# Patient Record
Sex: FEMALE | Race: WHITE | NOT HISPANIC OR LATINO | Employment: OTHER | URBAN - NONMETROPOLITAN AREA
[De-identification: names, ages, dates, MRNs, and addresses within clinical notes are randomized per-mention and may not be internally consistent; named-entity substitution may affect disease eponyms.]

---

## 2017-03-27 ENCOUNTER — OFFICE VISIT (OUTPATIENT)
Dept: CARDIOLOGY | Facility: CLINIC | Age: 67
End: 2017-03-27

## 2017-03-27 VITALS
BODY MASS INDEX: 27.11 KG/M2 | HEIGHT: 63 IN | SYSTOLIC BLOOD PRESSURE: 120 MMHG | DIASTOLIC BLOOD PRESSURE: 84 MMHG | HEART RATE: 80 BPM | WEIGHT: 153 LBS

## 2017-03-27 DIAGNOSIS — I25.2 OLD MI (MYOCARDIAL INFARCTION): ICD-10-CM

## 2017-03-27 DIAGNOSIS — E78.00 PURE HYPERCHOLESTEROLEMIA: ICD-10-CM

## 2017-03-27 DIAGNOSIS — E11.9 CONTROLLED TYPE 2 DIABETES MELLITUS WITHOUT COMPLICATION, WITHOUT LONG-TERM CURRENT USE OF INSULIN (HCC): ICD-10-CM

## 2017-03-27 DIAGNOSIS — I51.9 LEFT VENTRICULAR DYSFUNCTION: Primary | ICD-10-CM

## 2017-03-27 DIAGNOSIS — I10 ESSENTIAL HYPERTENSION: ICD-10-CM

## 2017-03-27 DIAGNOSIS — Z79.899 MEDICATION MANAGEMENT: ICD-10-CM

## 2017-03-27 DIAGNOSIS — F17.210 CIGARETTE SMOKER: ICD-10-CM

## 2017-03-27 DIAGNOSIS — Z95.5 HISTORY OF PLACEMENT OF STENT IN LAD CORONARY ARTERY: ICD-10-CM

## 2017-03-27 PROCEDURE — 99406 BEHAV CHNG SMOKING 3-10 MIN: CPT | Performed by: NURSE PRACTITIONER

## 2017-03-27 PROCEDURE — 99213 OFFICE O/P EST LOW 20 MIN: CPT | Performed by: NURSE PRACTITIONER

## 2017-03-27 RX ORDER — LACTOBACILLUS RHAMNOSUS GG 10B CELL
1 CAPSULE ORAL DAILY
COMMUNITY
End: 2018-11-06 | Stop reason: ALTCHOICE

## 2017-03-27 RX ORDER — FLUTICASONE PROPIONATE 50 MCG
2 SPRAY, SUSPENSION (ML) NASAL DAILY PRN
COMMUNITY
End: 2018-11-06 | Stop reason: ALTCHOICE

## 2017-03-27 NOTE — PROGRESS NOTES
Chief Complaint   Patient presents with   • Follow-up     6 month follow-up, denies cardiac symptoms,recent labs per PCP.   • Med Refill     needs refills on losartan, plavix, nitrostat, patient brought medications with visit.       Estelle Ford is a 66 y.o. female  with a history of ischemic heart disease diagnosed in 1999 when she had stenting of the LAD. She presented in June of 2014 with acute MI found to have 100% occluded LAD and underwent stenting of the same. Post interventional course was complicated with respiratory distress that slowly improved. At the time of discharge, EF improved to 40%. Last echo in March showed EF stable at 45-50%, WMA in the septum similar to prior. Today she comes to the office for a follow up appointment and no complaints are voiced. She remains active and denies significant fatigue, chest pain or palpitations. She continues to have shortness of breath but continues to smoke.     HPI         Cardiac History:    Past Surgical History:   Procedure Laterality Date   • CARDIOVASCULAR STRESS TEST  03/20/2008    6 min, 97% THR. Negative.   • CARDIOVASCULAR STRESS TEST  08/01/2012    3 min, 87%THR. R/O Septal Ischemia   • CATH LAB PROCEDURE  1999    stenting   • CATH LAB PROCEDURE  05/13/2005    60% LAD and D1   • CATH LAB PROCEDURE  06/24/2014    Dr Covington- 100% LAD  3.2x12 Promus   • ECHO - CONVERTED  03/20/2008    EF >60%   • ECHO - CONVERTED  08/01/2012    EF 60% Septal WMA   • ECHO - CONVERTED  06/24/2014    ([facility]) EF 40%. Septal WMA. RVSP- 40mmHg   • ECHO - CONVERTED  03/15/2016    EF 45-50%, apical WMA, similar to prior       Current Outpatient Prescriptions   Medication Sig Dispense Refill   • aspirin 81 MG EC tablet Take 81 mg by mouth daily.     • carvedilol (COREG) 6.25 MG tablet 1/2 tab twice a day     • cholecalciferol (VITAMIN D3) 1000 UNITS tablet Take 1,000 Units by mouth daily.     • clopidogrel (PLAVIX) 75 MG tablet Take 1 tablet by mouth Daily. 30  "tablet 7   • fluticasone (FLONASE) 50 MCG/ACT nasal spray 2 sprays into each nostril Daily.     • losartan (COZAAR) 50 MG tablet Take 1 tablet by mouth Daily. 30 tablet 7   • metFORMIN (GLUCOPHAGE) 500 MG tablet Take 500 mg by mouth Daily With Breakfast.     • Naphazoline-Pheniramine (EQ EYE ALLERGY RELIEF OP) Apply 1 drop to eye Daily. 1 drop each eye daily as needed.     • probiotic (CULTURELLE) capsule capsule Take 1 capsule by mouth Daily.     • ZETIA 10 MG tablet TAKE ONE TABLET BY MOUTH DAILY 30 tablet 7   • nitroglycerin (NITROSTAT) 0.4 MG SL tablet 1 under the tongue as needed for angina, may repeat q5mins for up three doses 25 tablet 1     No current facility-administered medications for this visit.        Amoxicillin; Dye fdc red [red dye]; Penicillins; Sulfa antibiotics; and Tetracyclines & related    Past Medical History:   Diagnosis Date   • COPD (chronic obstructive pulmonary disease)    • Diabetes mellitus    • History of hysterectomy    • Hx of appendectomy    • Hx of cholecystectomy    • Hyperlipidemia    • Hypertension    • IHD (ischemic heart disease)        Social History     Social History   • Marital status:      Spouse name: N/A   • Number of children: N/A   • Years of education: N/A     Occupational History   • Not on file.     Social History Main Topics   • Smoking status: Current Every Day Smoker     Packs/day: 0.50     Types: Cigarettes   • Smokeless tobacco: Never Used   • Alcohol use No   • Drug use: No   • Sexual activity: Not on file     Other Topics Concern   • Not on file     Social History Narrative       Family History   Problem Relation Age of Onset   • Asthma Mother    • Stroke Father    • Hypertension Father    • Hyperlipidemia Father    • Heart attack Other        Review of Systems   Constitutional: Negative.    HENT: Negative.    Respiratory: Positive for cough (\"smokers cough\") and shortness of breath.    Cardiovascular: Negative.    Gastrointestinal: Negative.  " "  Genitourinary: Negative.    Musculoskeletal: Positive for arthralgias. Negative for gait problem.   Neurological: Negative.    Psychiatric/Behavioral: Negative for sleep disturbance. The patient is nervous/anxious (reports a lot of stress related to  illness and daughter diagnosed with cancer. ).        Diabetes- No  Thyroid-normal    Objective     /84 (BP Location: Left arm)  Pulse 80  Ht 63\" (160 cm)  Wt 153 lb (69.4 kg)  BMI 27.1 kg/m2    Physical Exam   Constitutional: She is oriented to person, place, and time. Vital signs are normal. She appears well-developed.   Eyes: Pupils are equal, round, and reactive to light.   Neck: Neck supple. No JVD present. Carotid bruit is not present.   Cardiovascular: Normal rate, regular rhythm and S1 normal.    Pulses:       Radial pulses are 2+ on the right side, and 2+ on the left side.   Slightly loud S2   Pulmonary/Chest: Effort normal. No respiratory distress. She has wheezes.   Abdominal: Soft. Bowel sounds are normal.   Musculoskeletal: She exhibits no edema.   Neurological: She is alert and oriented to person, place, and time.   Skin: Skin is warm and dry.   Psychiatric: She has a normal mood and affect. Her behavior is normal. Judgment and thought content normal.   Vitals reviewed.    Procedures        Assessment/Plan      Nelida was seen today for follow-up and med refill.    Diagnoses and all orders for this visit:    Left ventricular dysfunction    Essential hypertension    Pure hypercholesterolemia    Medication management    Controlled type 2 diabetes mellitus without complication, without long-term current use of insulin    Old MI (myocardial infarction)    History of placement of stent in LAD coronary artery    Cigarette smoker    Other orders  -     clopidogrel (PLAVIX) 75 MG tablet; Take 1 tablet by mouth Daily.  -     losartan (COZAAR) 50 MG tablet; Take 1 tablet by mouth Daily.  -     nitroglycerin (NITROSTAT) 0.4 MG SL tablet; 1 under " the tongue as needed for angina, may repeat q5mins for up three doses      We discussed repeating echocardiogram but given she had some improvement noted on 2016 echo, no new or worsening symptoms, and improvement in fatigue we will defer until next visit and then reconsider echo and stress test. No medication changes made today. We discussed smoking cessation for 3 minutes. She understands the risk to health if she continues to smoke. In the past she has failed nicoderm patches and is unable to use gum due to problems with her teeth. She agrees to try weaning off cigarettes and to try to develop healthier habit in place of smoking.            Electronically signed by ROSALINDA Pires,  March 28, 2017 8:47 AM

## 2017-03-28 RX ORDER — CLOPIDOGREL BISULFATE 75 MG/1
75 TABLET ORAL DAILY
Qty: 30 TABLET | Refills: 7 | Status: SHIPPED | OUTPATIENT
Start: 2017-03-28 | End: 2017-11-07 | Stop reason: SDUPTHER

## 2017-03-28 RX ORDER — LOSARTAN POTASSIUM 50 MG/1
50 TABLET ORAL DAILY
Qty: 30 TABLET | Refills: 7 | Status: SHIPPED | OUTPATIENT
Start: 2017-03-28 | End: 2017-11-07 | Stop reason: SDUPTHER

## 2017-03-28 RX ORDER — NITROGLYCERIN 0.4 MG/1
TABLET SUBLINGUAL
Qty: 25 TABLET | Refills: 1 | Status: SHIPPED | OUTPATIENT
Start: 2017-03-28 | End: 2018-05-09 | Stop reason: SDUPTHER

## 2017-07-20 RX ORDER — CARVEDILOL 6.25 MG/1
TABLET ORAL
Qty: 60 TABLET | Refills: 10 | Status: SHIPPED | OUTPATIENT
Start: 2017-07-20 | End: 2017-11-07 | Stop reason: DRUGHIGH

## 2017-07-20 NOTE — TELEPHONE ENCOUNTER
Spoke with pt, she actually takes 1/2 tab BID but would like us to go ahead and send in like it has been for 1 tab daily.

## 2017-08-28 ENCOUNTER — TELEPHONE (OUTPATIENT)
Dept: CARDIOLOGY | Facility: CLINIC | Age: 67
End: 2017-08-28

## 2017-08-28 NOTE — TELEPHONE ENCOUNTER
[facility] faxed over a cardiac clearance request for colonoscopy on 09/19. Patient is taking aspirin 81 mg and plavix.

## 2017-11-07 ENCOUNTER — OFFICE VISIT (OUTPATIENT)
Dept: CARDIOLOGY | Facility: CLINIC | Age: 67
End: 2017-11-07

## 2017-11-07 VITALS
SYSTOLIC BLOOD PRESSURE: 134 MMHG | BODY MASS INDEX: 26.05 KG/M2 | WEIGHT: 147 LBS | DIASTOLIC BLOOD PRESSURE: 86 MMHG | HEART RATE: 76 BPM | HEIGHT: 63 IN

## 2017-11-07 DIAGNOSIS — E11.9 CONTROLLED TYPE 2 DIABETES MELLITUS WITHOUT COMPLICATION, WITHOUT LONG-TERM CURRENT USE OF INSULIN (HCC): ICD-10-CM

## 2017-11-07 DIAGNOSIS — Z95.5 HISTORY OF PLACEMENT OF STENT IN LAD CORONARY ARTERY: ICD-10-CM

## 2017-11-07 DIAGNOSIS — F17.210 CIGARETTE SMOKER: ICD-10-CM

## 2017-11-07 DIAGNOSIS — I25.6 SILENT MYOCARDIAL ISCHEMIA: ICD-10-CM

## 2017-11-07 DIAGNOSIS — I10 ESSENTIAL HYPERTENSION: ICD-10-CM

## 2017-11-07 DIAGNOSIS — I25.9 IHD (ISCHEMIC HEART DISEASE): ICD-10-CM

## 2017-11-07 DIAGNOSIS — I51.9 LEFT VENTRICULAR DYSFUNCTION: ICD-10-CM

## 2017-11-07 DIAGNOSIS — R07.89 OTHER CHEST PAIN: Primary | ICD-10-CM

## 2017-11-07 DIAGNOSIS — I25.2 OLD MI (MYOCARDIAL INFARCTION): ICD-10-CM

## 2017-11-07 PROCEDURE — 99406 BEHAV CHNG SMOKING 3-10 MIN: CPT | Performed by: NURSE PRACTITIONER

## 2017-11-07 PROCEDURE — 99214 OFFICE O/P EST MOD 30 MIN: CPT | Performed by: NURSE PRACTITIONER

## 2017-11-07 RX ORDER — LOSARTAN POTASSIUM 50 MG/1
50 TABLET ORAL DAILY
Qty: 30 TABLET | Refills: 7 | Status: SHIPPED | OUTPATIENT
Start: 2017-11-07 | End: 2018-05-09 | Stop reason: SDUPTHER

## 2017-11-07 RX ORDER — CLOPIDOGREL BISULFATE 75 MG/1
75 TABLET ORAL DAILY
Qty: 30 TABLET | Refills: 7 | Status: SHIPPED | OUTPATIENT
Start: 2017-11-07 | End: 2018-05-09 | Stop reason: SDUPTHER

## 2017-11-07 RX ORDER — LORATADINE 10 MG/1
10 TABLET ORAL DAILY PRN
COMMUNITY

## 2017-11-07 RX ORDER — EZETIMIBE 10 MG/1
10 TABLET ORAL DAILY
Qty: 30 TABLET | Refills: 7 | Status: SHIPPED | OUTPATIENT
Start: 2017-11-07 | End: 2018-05-09 | Stop reason: SDUPTHER

## 2017-11-07 RX ORDER — CARVEDILOL 6.25 MG/1
TABLET ORAL
COMMUNITY
End: 2018-05-09 | Stop reason: SDUPTHER

## 2017-11-07 NOTE — PROGRESS NOTES
Chief Complaint   Patient presents with   • Follow-up     For cardiac management. She states had some tightness in chest, relates to allergies with weather change. She is still trying to walk daily. Has copy of most recent labs.   • Med Refill     Needs refills on Plavix, Zetia, and Losartan-30 day.       Cardiac Complaints  Chest tightness      Subjective   Nelida Ford is a 67 y.o. female with HTN, hyperlipidemia, and a history of ischemic heart disease diagnosed in 1999 when she had stenting of the LAD. She presented in June of 2014 with acute MI found to have 100% occluded LAD and underwent stenting of the same. Post interventional course was complicated with respiratory distress that slowly improved. At the time of discharge, EF improved to 40%. Last echo in March showed EF stable at 45-50%, WMA in the septum similar to prior. She returns today for follow up and denies any new concerns.  She does report being very active at home walking about 2-3 miles a day without problems.  She does report some tightness in her chest recently but thinks it may be the allergies, as she is allergic to leaves.  Most recent labs were brought with the patient today that showed her potassium slightly elevated at 5.2, hemoglobin A1C, and LDL of 124.  She is statin intolerant but remains on zetia.  She does report she was trying to make dietary changes and was doing very well but then had a colonoscopy and has back tracked with diet since that time.  Refills of cardiac meds requested for 30 day supply.        Cardiac History  Past Surgical History:   Procedure Laterality Date   • CARDIOVASCULAR STRESS TEST  03/20/2008    6 min, 97% THR. Negative.   • CARDIOVASCULAR STRESS TEST  08/01/2012    3 min, 87%THR. R/O Septal Ischemia   • CATH LAB PROCEDURE  1999    stenting   • CATH LAB PROCEDURE  05/13/2005    60% LAD and D1   • CATH LAB PROCEDURE  06/24/2014    Dr Covington- 100% LAD  3.2x12 Promus   • ECHO - CONVERTED  03/20/2008    EF  >60%   • ECHO - CONVERTED  08/01/2012    EF 60% Septal WMA   • ECHO - CONVERTED  06/24/2014    ([facility]) EF 40%. Septal WMA. RVSP- 40mmHg   • ECHO - CONVERTED  03/15/2016    EF 45-50%, apical WMA, similar to prior       Current Outpatient Prescriptions   Medication Sig Dispense Refill   • aspirin 81 MG EC tablet Take 81 mg by mouth daily.     • carvedilol (COREG) 6.25 MG tablet 1/2 tab twice a day     • carvedilol (COREG) 6.25 MG tablet 1/2 tablet twice daily     • cholecalciferol (VITAMIN D3) 1000 UNITS tablet Take 1,000 Units by mouth daily.     • clopidogrel (PLAVIX) 75 MG tablet Take 1 tablet by mouth Daily. 30 tablet 7   • ezetimibe (ZETIA) 10 MG tablet Take 1 tablet by mouth Daily. 30 tablet 7   • fluticasone (FLONASE) 50 MCG/ACT nasal spray 2 sprays into each nostril Daily As Needed.     • loratadine (CLARITIN) 10 MG tablet Take 10 mg by mouth Daily As Needed for Allergies.     • losartan (COZAAR) 50 MG tablet Take 1 tablet by mouth Daily. 30 tablet 7   • nitroglycerin (NITROSTAT) 0.4 MG SL tablet 1 under the tongue as needed for angina, may repeat q5mins for up three doses 25 tablet 1   • probiotic (CULTURELLE) capsule capsule Take 1 capsule by mouth Daily.       No current facility-administered medications for this visit.        Amoxicillin; Dye fdc red [red dye]; Penicillins; Sulfa antibiotics; and Tetracyclines & related    Past Medical History:   Diagnosis Date   • COPD (chronic obstructive pulmonary disease)    • Diabetes mellitus    • H/O colonoscopy with polypectomy    • History of hysterectomy    • Hx of appendectomy    • Hx of cholecystectomy    • Hyperlipidemia    • Hypertension    • IHD (ischemic heart disease)    • Seasonal allergies        Social History     Social History   • Marital status:      Spouse name: N/A   • Number of children: N/A   • Years of education: N/A     Occupational History   • Not on file.     Social History Main Topics   • Smoking status: Current Every Day Smoker      "Packs/day: 0.50     Types: Cigarettes   • Smokeless tobacco: Never Used   • Alcohol use No   • Drug use: No   • Sexual activity: Not on file     Other Topics Concern   • Not on file     Social History Narrative       Family History   Problem Relation Age of Onset   • Asthma Mother    • Stroke Father    • Hypertension Father    • Hyperlipidemia Father    • Heart attack Other        Review of Systems   Constitution: Negative for weakness and malaise/fatigue.   Cardiovascular: Positive for chest pain. Negative for dyspnea on exertion, near-syncope and syncope.   Respiratory: Negative for shortness of breath and wheezing.    Musculoskeletal: Negative for arthritis and back pain.   Gastrointestinal: Negative for anorexia, heartburn and nausea.   Genitourinary: Negative for dysuria, hematuria and nocturia.   Neurological: Negative for dizziness, light-headedness and loss of balance.   Psychiatric/Behavioral: Negative for altered mental status and depression.       DiabetesNo  Thyroidnormal    Objective     /86 (BP Location: Left arm)  Pulse 76  Ht 63\" (160 cm)  Wt 147 lb (66.7 kg)  BMI 26.04 kg/m2    Physical Exam   Constitutional: She is oriented to person, place, and time. She appears well-developed and well-nourished.   HENT:   Head: Normocephalic and atraumatic.   Eyes: EOM are normal. Pupils are equal, round, and reactive to light.   Neck: Normal range of motion. Neck supple.   Cardiovascular: Normal rate and regular rhythm.    Murmur heard.  Pulmonary/Chest: Effort normal and breath sounds normal.   Abdominal: Soft.   Musculoskeletal: Normal range of motion.   Neurological: She is alert and oriented to person, place, and time.   Skin: Skin is warm and dry.   Psychiatric: She has a normal mood and affect. Her behavior is normal.       Procedures    Assessment/Plan     HR is stable at 76 and BP stable at 134/86.  Cardiac refills sent per request.  Repeat cardiac workup advised today as she does report chest " tightness, history of IHD with stenting to LAD, and risk for silent ischemic burden, more recommendations to follow.  Labs are done with your office, most recent reviewed with patient showed potassium slightly elevated at 5.2, she reports a diet high in potassium and was encouraged to limit her green leafy intake.  AIC was 6.0 and patient advised to continue limiting her carbohydrates and continue with her walking regimen.  Unfortunately, she is still smoking but has cut back, we discussed benefit of cessation for greater than 5 minutes. She states she has tried several modalities including chantix and nicotine patches to quit smoking but has been unsuccessful.  6 month follow up will be advised or sooner if needed.  Patient advised to call for further recommendations with any problems.       Problems Addressed this Visit        Cardiovascular and Mediastinum    Left ventricular dysfunction    Relevant Medications    carvedilol (COREG) 6.25 MG tablet    clopidogrel (PLAVIX) 75 MG tablet    Other Relevant Orders    Adult Transthoracic Echo Complete W/ Cont if Necessary Per Protocol    Stress Test With Myocardial Perfusion One Day    HTN (hypertension)    Relevant Medications    carvedilol (COREG) 6.25 MG tablet    losartan (COZAAR) 50 MG tablet    Old MI (myocardial infarction)    Relevant Medications    carvedilol (COREG) 6.25 MG tablet    clopidogrel (PLAVIX) 75 MG tablet    Other Relevant Orders    Adult Transthoracic Echo Complete W/ Cont if Necessary Per Protocol    Stress Test With Myocardial Perfusion One Day       Endocrine    Controlled type 2 diabetes mellitus without complication, without long-term current use of insulin    Relevant Orders    Adult Transthoracic Echo Complete W/ Cont if Necessary Per Protocol    Stress Test With Myocardial Perfusion One Day       Other    History of placement of stent in LAD coronary artery    Cigarette smoker    Relevant Orders    Adult Transthoracic Echo Complete W/ Cont  if Necessary Per Protocol    Stress Test With Myocardial Perfusion One Day      Other Visit Diagnoses     Other chest pain    -  Primary    Relevant Orders    Adult Transthoracic Echo Complete W/ Cont if Necessary Per Protocol    Stress Test With Myocardial Perfusion One Day    IHD (ischemic heart disease)        Relevant Medications    carvedilol (COREG) 6.25 MG tablet    clopidogrel (PLAVIX) 75 MG tablet    Other Relevant Orders    Adult Transthoracic Echo Complete W/ Cont if Necessary Per Protocol    Stress Test With Myocardial Perfusion One Day    Silent myocardial ischemia        Relevant Medications    carvedilol (COREG) 6.25 MG tablet    clopidogrel (PLAVIX) 75 MG tablet    Other Relevant Orders    Adult Transthoracic Echo Complete W/ Cont if Necessary Per Protocol    Stress Test With Myocardial Perfusion One Day                  Electronically signed by ROSALINDA Baron November 7, 2017 4:32 PM

## 2017-11-16 ENCOUNTER — HOSPITAL ENCOUNTER (OUTPATIENT)
Dept: CARDIOLOGY | Facility: HOSPITAL | Age: 67
Discharge: HOME | End: 2017-11-16

## 2017-11-16 ENCOUNTER — OUTSIDE FACILITY SERVICE (OUTPATIENT)
Dept: CARDIOLOGY | Facility: CLINIC | Age: 67
End: 2017-11-16

## 2017-11-16 DIAGNOSIS — E11.9 CONTROLLED TYPE 2 DIABETES MELLITUS WITHOUT COMPLICATION, WITHOUT LONG-TERM CURRENT USE OF INSULIN (HCC): ICD-10-CM

## 2017-11-16 DIAGNOSIS — I25.2 OLD MI (MYOCARDIAL INFARCTION): ICD-10-CM

## 2017-11-16 DIAGNOSIS — I51.9 LEFT VENTRICULAR DYSFUNCTION: ICD-10-CM

## 2017-11-16 DIAGNOSIS — R07.89 OTHER CHEST PAIN: ICD-10-CM

## 2017-11-16 DIAGNOSIS — I25.9 IHD (ISCHEMIC HEART DISEASE): ICD-10-CM

## 2017-11-16 DIAGNOSIS — I25.6 SILENT MYOCARDIAL ISCHEMIA: ICD-10-CM

## 2017-11-16 DIAGNOSIS — F17.210 CIGARETTE SMOKER: ICD-10-CM

## 2017-11-16 LAB
MAXIMAL PREDICTED HEART RATE: 151 BPM
MAXIMAL PREDICTED HEART RATE: 151 BPM
STRESS TARGET HR: 128 BPM
STRESS TARGET HR: 128 BPM

## 2017-11-16 PROCEDURE — 93306 TTE W/DOPPLER COMPLETE: CPT

## 2017-11-16 PROCEDURE — 93018 CV STRESS TEST I&R ONLY: CPT | Performed by: INTERNAL MEDICINE

## 2017-11-16 PROCEDURE — 78452 HT MUSCLE IMAGE SPECT MULT: CPT

## 2017-11-16 PROCEDURE — 78452 HT MUSCLE IMAGE SPECT MULT: CPT | Performed by: INTERNAL MEDICINE

## 2017-11-16 PROCEDURE — 0 TECHNETIUM SESTAMIBI: Performed by: INTERNAL MEDICINE

## 2017-11-16 PROCEDURE — A9500 TC99M SESTAMIBI: HCPCS | Performed by: INTERNAL MEDICINE

## 2017-11-16 PROCEDURE — 93017 CV STRESS TEST TRACING ONLY: CPT

## 2017-11-16 RX ADMIN — TECHNETIUM TC-99M SESTAMIBI 1 DOSE: 1 INJECTION INTRAVENOUS at 09:45

## 2017-11-17 PROCEDURE — 93306 TTE W/DOPPLER COMPLETE: CPT | Performed by: INTERNAL MEDICINE

## 2017-11-20 ENCOUNTER — TELEPHONE (OUTPATIENT)
Dept: CARDIOLOGY | Facility: CLINIC | Age: 67
End: 2017-11-20

## 2017-11-20 RX ORDER — ISOSORBIDE MONONITRATE 30 MG/1
30 TABLET, EXTENDED RELEASE ORAL EVERY MORNING
Qty: 30 TABLET | Refills: 8 | Status: SHIPPED | OUTPATIENT
Start: 2017-11-20 | End: 2017-11-28 | Stop reason: SINTOL

## 2017-11-20 NOTE — TELEPHONE ENCOUNTER
Patient aware of stress test and echo results and recommendations.  Add Imdur 30 mg daily.  Patient aware to call if chest tightness persist, may need LHC.

## 2017-11-28 ENCOUNTER — TELEPHONE (OUTPATIENT)
Dept: CARDIOLOGY | Facility: CLINIC | Age: 67
End: 2017-11-28

## 2017-11-28 RX ORDER — AMLODIPINE BESYLATE 2.5 MG/1
2.5 TABLET ORAL DAILY
Qty: 30 TABLET | Refills: 11 | Status: SHIPPED | OUTPATIENT
Start: 2017-11-28 | End: 2018-05-09 | Stop reason: SINTOL

## 2017-11-28 NOTE — TELEPHONE ENCOUNTER
Patient called in reporting since starting Imdur has had severe headaches unable to tolerate it. What are your recommendation's?     Meds:  Losartan 50mg daily  imdur 30mg daily  Carvedilol 6.25mg 1/2 tablet bid.  Aspirin 81mg daily

## 2018-05-09 ENCOUNTER — OFFICE VISIT (OUTPATIENT)
Dept: CARDIOLOGY | Facility: CLINIC | Age: 68
End: 2018-05-09

## 2018-05-09 ENCOUNTER — TELEPHONE (OUTPATIENT)
Dept: CARDIOLOGY | Facility: CLINIC | Age: 68
End: 2018-05-09

## 2018-05-09 VITALS
DIASTOLIC BLOOD PRESSURE: 82 MMHG | HEART RATE: 80 BPM | HEIGHT: 63 IN | SYSTOLIC BLOOD PRESSURE: 120 MMHG | BODY MASS INDEX: 26.22 KG/M2 | WEIGHT: 148 LBS

## 2018-05-09 DIAGNOSIS — Z95.5 HISTORY OF PLACEMENT OF STENT IN LAD CORONARY ARTERY: ICD-10-CM

## 2018-05-09 DIAGNOSIS — Z72.0 TOBACCO USE: ICD-10-CM

## 2018-05-09 DIAGNOSIS — I25.2 OLD MI (MYOCARDIAL INFARCTION): ICD-10-CM

## 2018-05-09 DIAGNOSIS — F17.200 TOBACCO USE DISORDER: ICD-10-CM

## 2018-05-09 DIAGNOSIS — F17.210 CIGARETTE SMOKER: ICD-10-CM

## 2018-05-09 DIAGNOSIS — I10 ESSENTIAL HYPERTENSION: ICD-10-CM

## 2018-05-09 DIAGNOSIS — E78.00 PURE HYPERCHOLESTEROLEMIA: ICD-10-CM

## 2018-05-09 DIAGNOSIS — R94.39 ABNORMAL CARDIOVASCULAR STRESS TEST: Primary | ICD-10-CM

## 2018-05-09 DIAGNOSIS — R05.9 COUGH: ICD-10-CM

## 2018-05-09 PROCEDURE — 99406 BEHAV CHNG SMOKING 3-10 MIN: CPT | Performed by: NURSE PRACTITIONER

## 2018-05-09 PROCEDURE — 99214 OFFICE O/P EST MOD 30 MIN: CPT | Performed by: NURSE PRACTITIONER

## 2018-05-09 RX ORDER — EZETIMIBE 10 MG/1
10 TABLET ORAL DAILY
Qty: 30 TABLET | Refills: 8 | Status: SHIPPED | OUTPATIENT
Start: 2018-05-09 | End: 2018-06-12 | Stop reason: SDUPTHER

## 2018-05-09 RX ORDER — LOSARTAN POTASSIUM 50 MG/1
50 TABLET ORAL DAILY
Qty: 30 TABLET | Refills: 8 | Status: SHIPPED | OUTPATIENT
Start: 2018-05-09 | End: 2018-11-06 | Stop reason: SDUPTHER

## 2018-05-09 RX ORDER — CARVEDILOL 6.25 MG/1
6.25 TABLET ORAL 2 TIMES DAILY WITH MEALS
Qty: 60 TABLET | Refills: 8 | Status: SHIPPED | OUTPATIENT
Start: 2018-05-09 | End: 2018-05-09

## 2018-05-09 RX ORDER — NITROGLYCERIN 0.4 MG/1
TABLET SUBLINGUAL
Qty: 25 TABLET | Refills: 1 | Status: SHIPPED | OUTPATIENT
Start: 2018-05-09 | End: 2020-01-06 | Stop reason: SDUPTHER

## 2018-05-09 RX ORDER — CLOPIDOGREL BISULFATE 75 MG/1
75 TABLET ORAL DAILY
Qty: 30 TABLET | Refills: 8 | Status: SHIPPED | OUTPATIENT
Start: 2018-05-09 | End: 2018-11-06 | Stop reason: SDUPTHER

## 2018-05-09 RX ORDER — CARVEDILOL 3.12 MG/1
3.12 TABLET ORAL 2 TIMES DAILY
Qty: 60 TABLET | Refills: 11 | Status: SHIPPED | OUTPATIENT
Start: 2018-05-09 | End: 2018-11-06 | Stop reason: SDUPTHER

## 2018-05-09 RX ORDER — CEPHALEXIN 500 MG/1
500 CAPSULE ORAL 2 TIMES DAILY
Qty: 28 CAPSULE | Refills: 0 | Status: SHIPPED | OUTPATIENT
Start: 2018-05-09 | End: 2018-11-06 | Stop reason: ALTCHOICE

## 2018-05-09 NOTE — TELEPHONE ENCOUNTER
[facility] called to clarify Coreg refill sent today.  They received two different directions.    Coreg 6.25 mg twice a day and 1/2 tablet twice a day.

## 2018-05-09 NOTE — PATIENT INSTRUCTIONS
Fat and Cholesterol Restricted Diet  High levels of fat and cholesterol in your blood may lead to various health problems, such as diseases of the heart, blood vessels, gallbladder, liver, and pancreas. Fats are concentrated sources of energy that come in various forms. Certain types of fat, including saturated fat, may be harmful in excess. Cholesterol is a substance needed by your body in small amounts. Your body makes all the cholesterol it needs. Excess cholesterol comes from the food you eat.  When you have high levels of cholesterol and saturated fat in your blood, health problems can develop because the excess fat and cholesterol will gather along the walls of your blood vessels, causing them to narrow. Choosing the right foods will help you control your intake of fat and cholesterol. This will help keep the levels of these substances in your blood within normal limits and reduce your risk of disease.  What is my plan?  Your health care provider recommends that you:  · Limit your fat intake to ______% or less of your total calories per day.  · Limit the amount of cholesterol in your diet to less than _________mg per day.  · Eat 20-30 grams of fiber each day.  What types of fat should I choose?  · Choose healthy fats more often. Choose monounsaturated and polyunsaturated fats, such as olive and canola oil, flaxseeds, walnuts, almonds, and seeds.  · Eat more omega-3 fats. Good choices include salmon, mackerel, sardines, tuna, flaxseed oil, and ground flaxseeds. Aim to eat fish at least two times a week.  · Limit saturated fats. Saturated fats are primarily found in animal products, such as meats, butter, and cream. Plant sources of saturated fats include palm oil, palm kernel oil, and coconut oil.  · Avoid foods with partially hydrogenated oils in them. These contain trans fats. Examples of foods that contain trans fats are stick margarine, some tub margarines, cookies, crackers, and other baked goods.  What  "general guidelines do I need to follow?  These guidelines for healthy eating will help you control your intake of fat and cholesterol:  · Check food labels carefully to identify foods with trans fats or high amounts of saturated fat.  · Fill one half of your plate with vegetables and green salads.  · Fill one fourth of your plate with whole grains. Look for the word \"whole\" as the first word in the ingredient list.  · Fill one fourth of your plate with lean protein foods.  · Limit fruit to two servings a day. Choose fruit instead of juice.  · Eat more foods that contain fiber, such as apples, broccoli, carrots, beans, peas, and barley.  · Eat more home-cooked food and less restaurant, buffet, and fast food.  · Limit or avoid alcohol.  · Limit foods high in starch and sugar.  · Limit fried foods.  · Cook foods using methods other than frying. Baking, boiling, grilling, and broiling are all great options.  · Lose weight if you are overweight. Losing just 5-10% of your initial body weight can help your overall health and prevent diseases such as diabetes and heart disease.  What foods can I eat?  Grains   · Whole grains, such as whole wheat or whole grain breads, crackers, cereals, and pasta. Unsweetened oatmeal, bulgur, barley, quinoa, or brown rice. Corn or whole wheat flour tortillas.  Vegetables   · Fresh or frozen vegetables (raw, steamed, roasted, or grilled). Green salads.  Fruits   · All fresh, canned (in natural juice), or frozen fruits.  Meats and other protein foods   · Ground beef (85% or leaner), grass-fed beef, or beef trimmed of fat. Skinless chicken or turkey. Ground chicken or turkey. Pork trimmed of fat. All fish and seafood. Eggs. Dried beans, peas, or lentils. Unsalted nuts or seeds. Unsalted canned or dry beans.  Dairy   · Low-fat dairy products, such as skim or 1% milk, 2% or reduced-fat cheeses, low-fat ricotta or cottage cheese, or plain low-fat yo  Fats and oils   · Tub margarines without trans " fats. Light or reduced-fat mayonnaise and salad dressings. Avocado. Olive, canola, sesame, or safflower oils. Natural peanut or almond butter (choose ones without added sugar and oil).  The items listed above may not be a complete list of recommended foods or beverages. Contact your dietitian for more options.   Foods to avoid  Grains   · White bread. White pasta. White rice. Cornbread. Bagels, pastries, and croissants. Crackers that contain trans fat.  Vegetables   · White potatoes. Corn. Creamed or fried vegetables. Vegetables in a cheese sauce.  Fruits   · Dried fruits. Canned fruit in light or heavy syrup. Fruit juice.  Meats and other protein foods   · Fatty cuts of meat. Ribs, chicken wings, romeo, sausage, bologna, salami, chitterlings, fatback, hot dogs, bratwurst, and packaged luncheon meats. Liver and organ meats.  Dairy   · Whole or 2% milk, cream, half-and-half, and cream cheese. Whole milk cheeses. Whole-fat or sweetened yogurt. Full-fat cheeses. Nondairy creamers and whipped toppings. Processed cheese, cheese spreads, or cheese curds.  Beverages   · Alcohol. Sweetened drinks (such as sodas, lemonade, and fruit drinks or punches).  Fats and oils   · Butter, stick margarine, lard, shortening, ghee, or romeo fat. Coconut, palm kernel, or palm oils.  Sweets and desserts   · Corn syrup, sugars, honey, and molasses. Candy. Jam and jelly. Syrup. Sweetened cereals. Cookies, pies, cakes, donuts, muffins, and ice cream.  The items listed above may not be a complete list of foods and beverages to avoid. Contact your dietitian for more information.   This information is not intended to replace advice given to you by your health care provider. Make sure you discuss any questions you have with your health care provider.  Document Released: 12/18/2006 Document Revised: 01/08/2016 Document Reviewed: 03/18/2015  [employer] Interactive Patient Education © 2017 [employer]  Steps to Quit Smoking  Smoking tobacco can be  bad for your health. It can also affect almost every organ in your body. Smoking puts you and people around you at risk for many serious long-lasting (chronic) diseases. Quitting smoking is hard, but it is one of the best things that you can do for your health. It is never too late to quit.  What are the benefits of quitting smoking?  When you quit smoking, you lower your risk for getting serious diseases and conditions. They can include:  · Lung cancer or lung disease.  · Heart disease.  · Stroke.  · Heart attack.  · Not being able to have children (infertility).  · Weak bones (osteoporosis) and broken bones (fractures).  If you have coughing, wheezing, and shortness of breath, those symptoms may get better when you quit. You may also get sick less often. If you are pregnant, quitting smoking can help to lower your chances of having a baby of low birth weight.  What can I do to help me quit smoking?  Talk with your doctor about what can help you quit smoking. Some things you can do (strategies) include:  · Quitting smoking totally, instead of slowly cutting back how much you smoke over a period of time.  · Going to in-person counseling. You are more likely to quit if you go to many counseling sessions.  · Using resources and support systems, such as:  ¨ Online chats with a counselor.  ¨ Phone quitlines.  ¨ Printed self-help materials.  ¨ Support groups or group counseling.  ¨ Text messaging programs.  ¨ Mobile phone apps or applications.  · Taking medicines. Some of these medicines may have nicotine in them. If you are pregnant or breastfeeding, do not take any medicines to quit smoking unless your doctor says it is okay. Talk with your doctor about counseling or other things that can help you.  Talk with your doctor about using more than one strategy at the same time, such as taking medicines while you are also going to in-person counseling. This can help make quitting easier.  What things can I do to make it  easier to quit?  Quitting smoking might feel very hard at first, but there is a lot that you can do to make it easier. Take these steps:  · Talk to your family and friends. Ask them to support and encourage you.  · Call phone quitlines, reach out to support groups, or work with a counselor.  · Ask people who smoke to not smoke around you.  · Avoid places that make you want (trigger) to smoke, such as:  ¨ Bars.  ¨ Parties.  ¨ Smoke-break areas at work.  · Spend time with people who do not smoke.  · Lower the stress in your life. Stress can make you want to smoke. Try these things to help your stress:  ¨ Getting regular exercise.  ¨ Deep-breathing exercises.  ¨ Yoga.  ¨ Meditating.  ¨ Doing a body scan. To do this, close your eyes, focus on one area of your body at a time from head to toe, and notice which parts of your body are tense. Try to relax the muscles in those areas.  · Download or buy apps on your mobile phone or tablet that can help you stick to your quit plan. There are many free apps, such as QuitGuide from the CDC (Centers for Disease Control and Prevention). You can find more support from smokefree.gov and other websites.  This information is not intended to replace advice given to you by your health care provider. Make sure you discuss any questions you have with your health care provider.  Document Released: 10/14/2010 Document Revised: 08/15/2017 Document Reviewed: 05/03/2016  [employer] Interactive Patient Education © 2017 [employer]    Food Choices for Gastroesophageal Reflux Disease, Adult  When you have gastroesophageal reflux disease (GERD), the foods you eat and your eating habits are very important. Choosing the right foods can help ease the discomfort of GERD. Consider working with a diet and nutrition specialist (dietitian) to help you make healthy food choices.  What general guidelines should I follow?  Eating plan   · Choose healthy foods low in fat, such as fruits, vegetables, whole  grains, low-fat dairy products, and lean meat, fish, and poultry.  · Eat frequent, small meals instead of three large meals each day. Eat your meals slowly, in a relaxed setting. Avoid bending over or lying down until 2-3 hours after eating.  · Limit high-fat foods such as fatty meats or fried foods.  · Limit your intake of oils, butter, and shortening to less than 8 teaspoons each day.  · Avoid the following:  ¨ Foods that cause symptoms. These may be different for different people. Keep a food diary to keep track of foods that cause symptoms.  ¨ Alcohol.  ¨ Drinking large amounts of liquid with meals.  ¨ Eating meals during the 2-3 hours before bed.  · Cook foods using methods other than frying. This may include baking, grilling, or broiling.  Lifestyle     · Maintain a healthy weight. Ask your health care provider what weight is healthy for you. If you need to lose weight, work with your health care provider to do so safely.  · Exercise for at least 30 minutes on 5 or more days each week, or as told by your health care provider.  · Avoid wearing clothes that fit tightly around your waist and chest.  · Do not use any products that contain nicotine or tobacco, such as cigarettes and e-cigarettes. If you need help quitting, ask your health care provider.  · Sleep with the head of your bed raised. Use a wedge under the mattress or blocks under the bed frame to raise the head of the bed.  What foods are not recommended?  The items listed may not be a complete list. Talk with your dietitian about what dietary choices are best for you.  Grains   Pastries or quick breads with added fat. French toast.  Vegetables   Deep fried vegetables. French fries. Any vegetables prepared with added fat. Any vegetables that cause symptoms. For some people this may include tomatoes and tomato products, chili peppers, onions and garlic, and horseradish.  Fruits   Any fruits prepared with added fat. Any fruits that cause symptoms. For  some people this may include citrus fruits, such as oranges, grapefruit, pineapple, and linwood.  Meats and other protein foods   High-fat meats, such as fatty beef or pork, hot dogs, ribs, ham, sausage, salami and romeo. Fried meat or protein, including fried fish and fried chicken. Nuts and nut butters.  Dairy   Whole milk and chocolate milk. Sour cream. Cream. Ice cream. Cream cheese. Milk shakes.  Beverages   Coffee and tea, with or without caffeine. Carbonated beverages. Sodas. Energy drinks. Fruit juice made with acidic fruits (such as orange or grapefruit). Tomato juice. Alcoholic drinks.  Fats and oils   Butter. Margarine. Shortening. Ghee.  Sweets and desserts   Chocolate and cocoa. Donuts.  Seasoning and other foods   Pepper. Peppermint and spearmint. Any condiments, herbs, or seasonings that cause symptoms. For some people, this may include miguel, hot sauce, or vinegar-based salad dressings.  Summary  · When you have gastroesophageal reflux disease (GERD), food and lifestyle choices are very important to help ease the discomfort of GERD.  · Eat frequent, small meals instead of three large meals each day. Eat your meals slowly, in a relaxed setting. Avoid bending over or lying down until 2-3 hours after eating.  · Limit high-fat foods such as fatty meat or fried foods.  This information is not intended to replace advice given to you by your health care provider. Make sure you discuss any questions you have with your health care provider.  Document Released: 12/18/2006 Document Revised: 12/19/2017 Document Reviewed: 12/19/2017  [employer] Interactive Patient Education © 2017 [employer]          Exercising to Lose Weight  Exercising can help you to lose weight. In order to lose weight through exercise, you need to do vigorous-intensity exercise. You can tell that you are exercising with vigorous intensity if you are breathing very hard and fast and cannot hold a conversation while  exercising.  Moderate-intensity exercise helps to maintain your current weight. You can tell that you are exercising at a moderate level if you have a higher heart rate and faster breathing, but you are still able to hold a conversation.  How often should I exercise?  Choose an activity that you enjoy and set realistic goals. Your health care provider can help you to make an activity plan that works for you. Exercise regularly as directed by your health care provider. This may include:  · Doing resistance training twice each week, such as:  ¨ Push-ups.  ¨ Sit-ups.  ¨ Lifting weights.  ¨ Using resistance bands.  · Doing a given intensity of exercise for a given amount of time. Choose from these options:  ¨ 150 minutes of moderate-intensity exercise every week.  ¨ 75 minutes of vigorous-intensity exercise every week.  ¨ A mix of moderate-intensity and vigorous-intensity exercise every week.  Children, pregnant women, people who are out of shape, people who are overweight, and older adults may need to consult a health care provider for individual recommendations. If you have any sort of medical condition, be sure to consult your health care provider before starting a new exercise program.  What are some activities that can help me to lose weight?  · Walking at a rate of at least 4.5 miles an hour.  · Jogging or running at a rate of 5 miles per hour.  · Biking at a rate of at least 10 miles per hour.  · Lap swimming.  · Roller-skating or in-line skating.  · Cross-country skiing.  · Vigorous competitive sports, such as football, basketball, and soccer.  · Jumping rope.  · Aerobic dancing.  How can I be more active in my day-to-day activities?  · Use the stairs instead of the elevator.  · Take a walk during your lunch break.  · If you drive, park your car farther away from work or school.  · If you take public transportation, get off one stop early and walk the rest of the way.  · Make all of your phone calls while  standing up and walking around.  · Get up, stretch, and walk around every 30 minutes throughout the day.  What guidelines should I follow while exercising?  · Do not exercise so much that you hurt yourself, feel dizzy, or get very short of breath.  · Consult your health care provider prior to starting a new exercise program.  · Wear comfortable clothes and shoes with good support.  · Drink plenty of water while you exercise to prevent dehydration or heat stroke. Body water is lost during exercise and must be replaced.  · Work out until you breathe faster and your heart beats faster.  This information is not intended to replace advice given to you by your health care provider. Make sure you discuss any questions you have with your health care provider.  Document Released: 01/20/2012 Document Revised: 05/25/2017 Document Reviewed: 05/21/2015  [employer] Interactive Patient Education © 2017 [employer]      Calorie Counting for Weight Loss  Calories are units of energy. Your body needs a certain amount of calories from food to keep you going throughout the day. When you eat more calories than your body needs, your body stores the extra calories as fat. When you eat fewer calories than your body needs, your body burns fat to get the energy it needs.  Calorie counting means keeping track of how many calories you eat and drink each day. Calorie counting can be helpful if you need to lose weight. If you make sure to eat fewer calories than your body needs, you should lose weight. Ask your health care provider what a healthy weight is for you.  For calorie counting to work, you will need to eat the right number of calories in a day in order to lose a healthy amount of weight per week. A dietitian can help you determine how many calories you need in a day and will give you suggestions on how to reach your calorie goal.  · A healthy amount of weight to lose per week is usually 1-2 lb (0.5-0.9 kg). This usually means that your  daily calorie intake should be reduced by 500-750 calories.  · Eating 1,200 - 1,500 calories per day can help most women lose weight.  · Eating 1,500 - 1,800 calories per day can help most men lose weight.  What is my plan?  My goal is to have __________ calories per day.  If I have this many calories per day, I should lose around __________ pounds per week.  What do I need to know about calorie counting?  In order to meet your daily calorie goal, you will need to:  · Find out how many calories are in each food you would like to eat. Try to do this before you eat.  · Decide how much of the food you plan to eat.  · Write down what you ate and how many calories it had. Doing this is called keeping a food log.  To successfully lose weight, it is important to balance calorie counting with a healthy lifestyle that includes regular activity. Aim for 150 minutes of moderate exercise (such as walking) or 75 minutes of vigorous exercise (such as running) each week.  Where do I find calorie information?     The number of calories in a food can be found on a Nutrition Facts label. If a food does not have a Nutrition Facts label, try to look up the calories online or ask your dietitian for help.  Remember that calories are listed per serving. If you choose to have more than one serving of a food, you will have to multiply the calories per serving by the amount of servings you plan to eat. For example, the label on a package of bread might say that a serving size is 1 slice and that there are 90 calories in a serving. If you eat 1 slice, you will have eaten 90 calories. If you eat 2 slices, you will have eaten 180 calories.  How do I keep a food log?  Immediately after each meal, record the following information in your food log:  · What you ate. Don't forget to include toppings, sauces, and other extras on the food.  · How much you ate. This can be measured in cups, ounces, or number of items.  · How many calories each food and  "drink had.  · The total number of calories in the meal.  Keep your food log near you, such as in a small notebook in your pocket, or use a mobile tereza or website. Some programs will calculate calories for you and show you how many calories you have left for the day to meet your goal.  What are some calorie counting tips?  · Use your calories on foods and drinks that will fill you up and not leave you hungry:  ¨ Some examples of foods that fill you up are nuts and nut butters, vegetables, lean proteins, and high-fiber foods like whole grains. High-fiber foods are foods with more than 5 g fiber per serving.  ¨ Drinks such as sodas, specialty coffee drinks, alcohol, and juices have a lot of calories, yet do not fill you up.  · Eat nutritious foods and avoid empty calories. Empty calories are calories you get from foods or beverages that do not have many vitamins or protein, such as candy, sweets, and soda. It is better to have a nutritious high-calorie food (such as an avocado) than a food with few nutrients (such as a bag of chips).  · Know how many calories are in the foods you eat most often. This will help you calculate calorie counts faster.  · Pay attention to calories in drinks. Low-calorie drinks include water and unsweetened drinks.  · Pay attention to nutrition labels for \"low fat\" or \"fat free\" foods. These foods sometimes have the same amount of calories or more calories than the full fat versions. They also often have added sugar, starch, or salt, to make up for flavor that was removed with the fat.  · Find a way of tracking calories that works for you. Get creative. Try different apps or programs if writing down calories does not work for you.  What are some portion control tips?  · Know how many calories are in a serving. This will help you know how many servings of a certain food you can have.  · Use a measuring cup to measure serving sizes. You could also try weighing out portions on a kitchen scale. " With time, you will be able to estimate serving sizes for some foods.  · Take some time to put servings of different foods on your favorite plates, bowls, and cups so you know what a serving looks like.  · Try not to eat straight from a bag or box. Doing this can lead to overeating. Put the amount you would like to eat in a cup or on a plate to make sure you are eating the right portion.  · Use smaller plates, glasses, and bowls to prevent overeating.  · Try not to multitask (for example, watch TV or use your computer) while eating. If it is time to eat, sit down at a table and enjoy your food. This will help you to know when you are full. It will also help you to be aware of what you are eating and how much you are eating.  What are tips for following this plan?  Reading food labels   · Check the calorie count compared to the serving size. The serving size may be smaller than what you are used to eating.  · Check the source of the calories. Make sure the food you are eating is high in vitamins and protein and low in saturated and trans fats.  Shopping   · Read nutrition labels while you shop. This will help you make healthy decisions before you decide to purchase your food.  · Make a grocery list and stick to it.  Cooking   · Try to cook your favorite foods in a healthier way. For example, try baking instead of frying.  · Use low-fat dairy products.  Meal planning   · Use more fruits and vegetables. Half of your plate should be fruits and vegetables.  · Include lean proteins like poultry and fish.  How do I count calories when eating out?  · Ask for smaller portion sizes.  · Consider sharing an entree and sides instead of getting your own entree.  · If you get your own entree, eat only half. Ask for a box at the beginning of your meal and put the rest of your entree in it so you are not tempted to eat it.  · If calories are listed on the menu, choose the lower calorie options.  · Choose dishes that include  vegetables, fruits, whole grains, low-fat dairy products, and lean protein.  · Choose items that are boiled, broiled, grilled, or steamed. Stay away from items that are buttered, battered, fried, or served with cream sauce. Items labeled “crispy” are usually fried, unless stated otherwise.  · Choose water, low-fat milk, unsweetened iced tea, or other drinks without added sugar. If you want an alcoholic beverage, choose a lower calorie option such as a glass of wine or light beer.  · Ask for dressings, sauces, and syrups on the side. These are usually high in calories, so you should limit the amount you eat.  · If you want a salad, choose a garden salad and ask for grilled meats. Avoid extra toppings like romeo, cheese, or fried items. Ask for the dressing on the side, or ask for olive oil and vinegar or lemon to use as dressing.  · Estimate how many servings of a food you are given. For example, a serving of cooked rice is ½ cup or about the size of half a baseball. Knowing serving sizes will help you be aware of how much food you are eating at restaurants. The list below tells you how big or small some common portion sizes are based on everyday objects:  ¨ 1 oz--4 stacked dice.  ¨ 3 oz--1 deck of cards.  ¨ 1 tsp--1 die.  ¨ 1 Tbsp--½ a ping-pong ball.  ¨ 2 Tbsp--1 ping-pong ball.  ¨ ½ cup--½ baseball.  ¨ 1 cup--1 baseball.  Summary  · Calorie counting means keeping track of how many calories you eat and drink each day. If you eat fewer calories than your body needs, you should lose weight.  · A healthy amount of weight to lose per week is usually 1-2 lb (0.5-0.9 kg). This usually means reducing your daily calorie intake by 500-750 calories.  · The number of calories in a food can be found on a Nutrition Facts label. If a food does not have a Nutrition Facts label, try to look up the calories online or ask your dietitian for help.  · Use your calories on foods and drinks that will fill you up, and not on foods and  drinks that will leave you hungry.  · Use smaller plates, glasses, and bowls to prevent overeating.  This information is not intended to replace advice given to you by your health care provider. Make sure you discuss any questions you have with your health care provider.  Document Released: 12/18/2006 Document Revised: 11/17/2017 Document Reviewed: 11/17/2017  [employer] Interactive Patient Education © 2017 [employer]          Steps to Quit Smoking  Smoking tobacco can be harmful to your health and can affect almost every organ in your body. Smoking puts you, and those around you, at risk for developing many serious chronic diseases. Quitting smoking is difficult, but it is one of the best things that you can do for your health. It is never too late to quit.  What are the benefits of quitting smoking?  When you quit smoking, you lower your risk of developing serious diseases and conditions, such as:  · Lung cancer or lung disease, such as COPD.  · Heart disease.  · Stroke.  · Heart attack.  · Infertility.  · Osteoporosis and bone fractures.  Additionally, symptoms such as coughing, wheezing, and shortness of breath may get better when you quit. You may also find that you get sick less often because your body is stronger at fighting off colds and infections. If you are pregnant, quitting smoking can help to reduce your chances of having a baby of low birth weight.  How do I get ready to quit?  When you decide to quit smoking, create a plan to make sure that you are successful. Before you quit:  · Pick a date to quit. Set a date within the next two weeks to give you time to prepare.  · Write down the reasons why you are quitting. Keep this list in places where you will see it often, such as on your bathroom mirror or in your car or wallet.  · Identify the people, places, things, and activities that make you want to smoke (triggers) and avoid them. Make sure to take these actions:  ¨ Throw away all cigarettes at home,  at work, and in your car.  ¨ Throw away smoking accessories, such as ashtrays and lighters.  ¨ Clean your car and make sure to empty the ashtray.  ¨ Clean your home, including curtains and carpets.  · Tell your family, friends, and coworkers that you are quitting. Support from your loved ones can make quitting easier.  · Talk with your health care provider about your options for quitting smoking.  · Find out what treatment options are covered by your health insurance.  What strategies can I use to quit smoking?  Talk with your healthcare provider about different strategies to quit smoking. Some strategies include:  · Quitting smoking altogether instead of gradually lessening how much you smoke over a period of time. Research shows that quitting “cold turkey” is more successful than gradually quitting.  · Attending in-person counseling to help you build problem-solving skills. You are more likely to have success in quitting if you attend several counseling sessions. Even short sessions of 10 minutes can be effective.  · Finding resources and support systems that can help you to quit smoking and remain smoke-free after you quit. These resources are most helpful when you use them often. They can include:  ¨ Online chats with a counselor.  ¨ Telephone quitlines.  ¨ Printed self-help materials.  ¨ Support groups or group counseling.  ¨ Text messaging programs.  ¨ Mobile phone applications.  · Taking medicines to help you quit smoking. (If you are pregnant or breastfeeding, talk with your health care provider first.) Some medicines contain nicotine and some do not. Both types of medicines help with cravings, but the medicines that include nicotine help to relieve withdrawal symptoms. Your health care provider may recommend:  ¨ Nicotine patches, gum, or lozenges.  ¨ Nicotine inhalers or sprays.  ¨ Non-nicotine medicine that is taken by mouth.  Talk with your health care provider about combining strategies, such as taking  medicines while you are also receiving in-person counseling. Using these two strategies together makes you more likely to succeed in quitting than if you used either strategy on its own.  If you are pregnant or breastfeeding, talk with your health care provider about finding counseling or other support strategies to quit smoking. Do not take medicine to help you quit smoking unless told to do so by your health care provider.  What things can I do to make it easier to quit?  Quitting smoking might feel overwhelming at first, but there is a lot that you can do to make it easier. Take these important actions:  · Reach out to your family and friends and ask that they support and encourage you during this time. Call telephone quitlines, reach out to support groups, or work with a counselor for support.  · Ask people who smoke to avoid smoking around you.  · Avoid places that trigger you to smoke, such as bars, parties, or smoke-break areas at work.  · Spend time around people who do not smoke.  · Lessen stress in your life, because stress can be a smoking trigger for some people. To lessen stress, try:  ¨ Exercising regularly.  ¨ Deep-breathing exercises.  ¨ Yoga.  ¨ Meditating.  ¨ Performing a body scan. This involves closing your eyes, scanning your body from head to toe, and noticing which parts of your body are particularly tense. Purposefully relax the muscles in those areas.  · Download or purchase mobile phone or tablet apps (applications) that can help you stick to your quit plan by providing reminders, tips, and encouragement. There are many free apps, such as QuitGuide from the CDC (Centers for Disease Control and Prevention). You can find other support for quitting smoking (smoking cessation) through smokefree.gov and other websites.  How will I feel when I quit smoking?  Within the first 24 hours of quitting smoking, you may start to feel some withdrawal symptoms. These symptoms are usually most noticeable  2-3 days after quitting, but they usually do not last beyond 2-3 weeks. Changes or symptoms that you might experience include:  · Mood swings.  · Restlessness, anxiety, or irritation.  · Difficulty concentrating.  · Dizziness.  · Strong cravings for sugary foods in addition to nicotine.  · Mild weight gain.  · Constipation.  · Nausea.  · Coughing or a sore throat.  · Changes in how your medicines work in your body.  · A depressed mood.  · Difficulty sleeping (insomnia).  After the first 2-3 weeks of quitting, you may start to notice more positive results, such as:  · Improved sense of smell and taste.  · Decreased coughing and sore throat.  · Slower heart rate.  · Lower blood pressure.  · Clearer skin.  · The ability to breathe more easily.  · Fewer sick days.  Quitting smoking is very challenging for most people. Do not get discouraged if you are not successful the first time. Some people need to make many attempts to quit before they achieve long-term success. Do your best to stick to your quit plan, and talk with your health care provider if you have any questions or concerns.  This information is not intended to replace advice given to you by your health care provider. Make sure you discuss any questions you have with your health care provider.  Document Released: 12/12/2002 Document Revised: 08/15/2017 Document Reviewed: 05/03/2016  [employer] Interactive Interactive Patient Education © 2017 [employer]

## 2018-05-09 NOTE — PROGRESS NOTES
"Chief Complaint   Patient presents with   • Follow-up     for cardiac management   • Medication Problem     pt stopped amlodipine due to GI upset, ' It really made my reflux worse\" states she doesn't care to take it but needs something for her stomach   • Med Refill     refills needed on cardiac meds including Nitro, 30 days to [facility]   • Labs     PCP checked within the past 6 months   • Chest Pain     episode at [employer] several months ago, exertional, had prepared a meal for [employer].        Estelle Ford is a 67 y.o. female  with HTN, hyperlipidemia, and a history of ischemic heart disease diagnosed in 1999 when she had stenting of the LAD. She presented in June of 2014 with acute MI found to have 100% occluded LAD and underwent stenting of the same. Post interventional course was complicated with respiratory distress that slowly improved. At the time of discharge, EF improved to 40%. Echo in March 2016 showed EF stable at 45-50%, WMA in the septum similar to prior. At November 2017 visit she reported chest tightness. Stress and echocardiogram were done and showed anteroseptal infarct with valarie-infarct ischemia and LVEF 45%. Addition of Imdur was recommended. Due to persistent severe headaches, Imdur was changed to Norvasc. Norvasc was stopped due increase in GERD symptoms.   Today she comes to the office for a follow up visit and denies recent chest pain/tightness. Several months ago, with significant exertional activity she had an episode of mild chest pain that resolved with rest. Currently, she is trying to stop smoking. She has recently started walking for exercise. Her main complaint is persistent cough with sputum since had the flue a few months ago. She requested prescription for Keflex which she has taken in the past without issue. Since stopping Amlodipine she denies reflux symptoms.     HPI     Cardiac History:    Past Surgical History:   Procedure Laterality Date   • CARDIAC " CATHETERIZATION  1999    stenting   • CARDIAC CATHETERIZATION  05/13/2005    60% LAD and D1   • CARDIAC CATHETERIZATION  06/24/2014    Dr Covington- 100% LAD  3.2x12 Promus   • CARDIOVASCULAR STRESS TEST  03/20/2008    6 min, 97% THR. Negative.   • CARDIOVASCULAR STRESS TEST  08/01/2012    3 min, 87%THR. R/O Septal Ischemia   • CARDIOVASCULAR STRESS TEST  11/16/2017    3 Min, 59 Secs. 86% THR. EF 37%. Anteroseptal Infarct with periinfarct Ischemia.   • ECHO - CONVERTED  03/20/2008    EF >60%   • ECHO - CONVERTED  08/01/2012    EF 60% Septal WMA   • ECHO - CONVERTED  06/24/2014    ([facility]) EF 40%. Septal WMA. RVSP- 40mmHg   • ECHO - CONVERTED  03/15/2016    EF 45-50%, apical WMA, similar to prior   • ECHO - CONVERTED  11/16/2017    EF 45%. Anteroseptal WMA       Current Outpatient Prescriptions   Medication Sig Dispense Refill   • aspirin 81 MG EC tablet Take 81 mg by mouth daily.     • carvedilol (COREG) 6.25 MG tablet Take 1 tablet by mouth 2 (Two) Times a Day With Meals. 1/2 tab twice a day 60 tablet 8   • cholecalciferol (VITAMIN D3) 1000 UNITS tablet Take 1,000 Units by mouth daily.     • clopidogrel (PLAVIX) 75 MG tablet Take 1 tablet by mouth Daily. 30 tablet 8   • ezetimibe (ZETIA) 10 MG tablet Take 1 tablet by mouth Daily. 30 tablet 8   • fluticasone (FLONASE) 50 MCG/ACT nasal spray 2 sprays into each nostril Daily As Needed.     • loratadine (CLARITIN) 10 MG tablet Take 10 mg by mouth Daily As Needed for Allergies.     • losartan (COZAAR) 50 MG tablet Take 1 tablet by mouth Daily. 30 tablet 8   • nitroglycerin (NITROSTAT) 0.4 MG SL tablet 1 under the tongue as needed for angina, may repeat q5mins for up three doses 25 tablet 1   • probiotic (CULTURELLE) capsule capsule Take 1 capsule by mouth Daily.     • cephalexin (KEFLEX) 500 MG capsule Take 1 capsule by mouth 2 (Two) Times a Day. 28 capsule 0     No current facility-administered medications for this visit.        Amoxicillin; Dye fdc red [red dye];  Penicillins; Sulfa antibiotics; Tetracyclines & related; and Zantac [ranitidine hcl]    Past Medical History:   Diagnosis Date   • COPD (chronic obstructive pulmonary disease)    • Diabetes mellitus    • H/O colonoscopy with polypectomy    • History of hysterectomy    • Hx of appendectomy    • Hx of cholecystectomy    • Hyperlipidemia    • Hypertension    • IHD (ischemic heart disease)    • Seasonal allergies        Social History     Social History   • Marital status:      Spouse name: N/A   • Number of children: N/A   • Years of education: N/A     Occupational History   • Not on file.     Social History Main Topics   • Smoking status: Current Every Day Smoker     Packs/day: 0.50     Types: Cigarettes   • Smokeless tobacco: Never Used   • Alcohol use No   • Drug use: No   • Sexual activity: Not on file     Other Topics Concern   • Not on file     Social History Narrative   • No narrative on file       Family History   Problem Relation Age of Onset   • Asthma Mother    • Stroke Father    • Hypertension Father    • Hyperlipidemia Father    • Heart attack Other        Review of Systems   Constitution: Negative for decreased appetite, diaphoresis, fever, weakness and malaise/fatigue.   HENT: Positive for congestion, hoarse voice and sore throat (off and on ). Negative for nosebleeds.    Eyes: Negative for pain and redness.   Cardiovascular: Negative for chest pain, dyspnea on exertion, irregular heartbeat, leg swelling, near-syncope and palpitations.   Respiratory: Positive for cough, shortness of breath and sputum production. Negative for hemoptysis and sleep disturbances due to breathing (denies).    Endocrine: Negative for polydipsia, polyphagia and polyuria.   Hematologic/Lymphatic: Negative for bleeding problem. Does not bruise/bleed easily.   Skin: Negative for dry skin and itching.   Musculoskeletal: Negative for falls, muscle cramps and muscle weakness.   Gastrointestinal: Positive for heartburn (none  "since stopped amlodipine). Negative for abdominal pain, dysphagia, melena and nausea.   Genitourinary: Negative for dysuria and hematuria.   Neurological: Negative for dizziness, headaches and loss of balance.   Psychiatric/Behavioral: Negative for memory loss. The patient has insomnia (at times). The patient is not nervous/anxious.    Allergic/Immunologic: Negative for hives.        Objective     /82   Pulse 80   Ht 160 cm (63\")   Wt 67.1 kg (148 lb)   BMI 26.22 kg/m²     Physical Exam   Constitutional: She is oriented to person, place, and time. Vital signs are normal. She appears well-developed and well-nourished. No distress.   HENT:   Head: Normocephalic.   Eyes: Conjunctivae are normal. Pupils are equal, round, and reactive to light.   Neck: Normal range of motion. Neck supple. No JVD present. Carotid bruit is not present. No thyromegaly present.   Cardiovascular: Normal rate, regular rhythm, S1 normal and S2 normal.    No murmur heard.  Pulmonary/Chest: She has wheezes.   Abdominal: Soft. Bowel sounds are normal. She exhibits no distension. There is no tenderness.   Musculoskeletal: Normal range of motion. She exhibits no edema or tenderness.   Neurological: She is alert and oriented to person, place, and time.   Skin: Skin is warm and dry. No rash noted. No pallor.   Psychiatric: She has a normal mood and affect. Her behavior is normal.   Vitals reviewed.       Procedures: none today        Assessment/Plan      Nelida was seen today for follow-up, medication problem, med refill, labs and chest pain.    Diagnoses and all orders for this visit:    Abnormal cardiovascular stress test    History of placement of stent in LAD coronary artery    Essential hypertension    Pure hypercholesterolemia    Old MI (myocardial infarction)    Tobacco use disorder    Cigarette smoker    Tobacco use    Cough    Other orders  -     cephalexin (KEFLEX) 500 MG capsule; Take 1 capsule by mouth 2 (Two) Times a Day.  -     " ezetimibe (ZETIA) 10 MG tablet; Take 1 tablet by mouth Daily.  -     nitroglycerin (NITROSTAT) 0.4 MG SL tablet; 1 under the tongue as needed for angina, may repeat q5mins for up three doses  -     losartan (COZAAR) 50 MG tablet; Take 1 tablet by mouth Daily.  -     clopidogrel (PLAVIX) 75 MG tablet; Take 1 tablet by mouth Daily.  -     carvedilol (COREG) 6.25 MG tablet; Take 1 tablet by mouth 2 (Two) Times a Day With Meals. 1/2 tab twice a day    With generic Zetia she has significant issues with constipation. With brand name Zetia she did not have an issue. I have requested brand name only due to this problem. She will follow with you for management of labs including lipid panel and LFT. Please forward a copy of lab results to the office.   Patient's Body mass index is 26.22 kg/m². BMI is slightly above normal parameters. Recommendations include: nutrition counseling. Low cholesterol diet information given.   I advised the patient of the risks in continuing to use tobacco, and I provided this patient with smoking cessation educational materials.During this visit, I spent >3 minutes counseling the patient regarding smoking cessation. In the past she has tried Wellbutrin, Chantix and Nicoderm patches without success. We discussed behavior modification to aid in smoking cessation.   She admits to being active throughout the day and has recent started walking for exercise. Should she develop any chest pain or have to use prn nitro, she understands to call. We did review the report of her most recent stress test which showed anteroseptal infarct and minimal valarie-infarct ischemia. She was not able to tolerate Imdur or Norvasc. We will use prn nitrostat instead. Her vital signs today are normal. Same cardiac medications advised. Refills given as noted.   Nelida reports persistent cough with thick yellow sputum at times. She requested Keflex and prescription given. She understands to follow with you if symptoms do not  improved. We also discussed reflux symptoms, which she states have now resolved. I explained persistent cough could be related to GERD. GERD management handout given to her.     We will see her back in 6 months or sooner for problems.            Electronically signed by ROSALINDA Pires,  May 9, 2018 10:51 AM

## 2018-06-12 ENCOUNTER — TELEPHONE (OUTPATIENT)
Dept: CARDIOLOGY | Facility: CLINIC | Age: 68
End: 2018-06-12

## 2018-06-12 RX ORDER — EZETIMIBE 10 MG/1
10 TABLET ORAL DAILY
Qty: 30 TABLET | Refills: 7 | Status: SHIPPED | OUTPATIENT
Start: 2018-06-12 | End: 2018-11-06 | Stop reason: SDUPTHER

## 2018-06-12 NOTE — TELEPHONE ENCOUNTER
Patient called stating insurance will no longer pay for the name brand only Zetia, patient requesting refill on Ezetimibe 10mg daily.  Refill on Ezetimibe 10mg daily sent to pharmacy.

## 2018-06-13 ENCOUNTER — PRIOR AUTHORIZATION (OUTPATIENT)
Dept: CARDIOLOGY | Facility: CLINIC | Age: 68
End: 2018-06-13

## 2018-11-05 NOTE — PROGRESS NOTES
Chief Complaint   Patient presents with   • Follow-up     Cardiac management. Has most recent labs. She reports started Nexium first of the month, has decreased the acid and she is feeling much better.   • Med Refill     Needs refills on cardiac medication-30 day.       Subjective       Nelida Ford is a 68 y.o. female with HTN, hyperlipidemia, and a history of IHD diagnosed in 1999 when she had stenting of the LAD. She presented in June of 2014 with acute MI found to have 100% occluded LAD and underwent stenting of the same. Post interventional course was complicated with respiratory distress that slowly improved. At the time of discharge, EF improved to 40%. Echo in March 2016 showed EF stable at 45-50%, WMA in the septum similar to prior. At November 2017 visit she reported chest tightness. Stress and echocardiogram showed anteroseptal infarct with valarie-infarct ischemia and LVEF 45%. Addition of Imdur was recommended. Due to persistent severe headaches, Imdur was changed to Norvasc. Norvasc was stopped due increase in GERD symptoms. Labs have been followed with Dr. Vergara. She is statin intolerant and remains on Zetia alone for lipid management. At last visit, she remained asymptomatic and was advised to use sl nitro as needed for chest pain. She came in today for her follow up. She denies chest pain or shortness of breath. Has not used sl nitro. She is trying to cut back on smoking but hasn't been able to quit. She has been busy with indoor and outdoor activities over the summer without developing symptoms. She brought in recent labs showing cholesterol 190, Tri 96, , HDL 45, glucose 114, BUN/Cr 15/0.7.     HPI         Cardiac History:    Past Surgical History:   Procedure Laterality Date   • CARDIAC CATHETERIZATION  1999    stenting   • CARDIAC CATHETERIZATION  05/13/2005    60% LAD and D1   • CARDIAC CATHETERIZATION  06/24/2014    Dr Covington- 100% LAD  3.2x12 Promus   • CARDIOVASCULAR STRESS TEST   03/20/2008    6 min, 97% THR. Negative.   • CARDIOVASCULAR STRESS TEST  08/01/2012    3 min, 87%THR. R/O Septal Ischemia   • CARDIOVASCULAR STRESS TEST  11/16/2017    3 Min, 59 Secs. 86% THR. EF 37%. Anteroseptal Infarct with periinfarct Ischemia.   • ECHO - CONVERTED  03/20/2008    EF >60%   • ECHO - CONVERTED  08/01/2012    EF 60% Septal WMA   • ECHO - CONVERTED  06/24/2014    ([facility]) EF 40%. Septal WMA. RVSP- 40mmHg   • ECHO - CONVERTED  03/15/2016    EF 45-50%, apical WMA, similar to prior   • ECHO - CONVERTED  11/16/2017    EF 45%. Anteroseptal WMA       Current Outpatient Prescriptions   Medication Sig Dispense Refill   • aspirin 81 MG EC tablet Take 81 mg by mouth daily.     • carvedilol (COREG) 3.125 MG tablet Take 1 tablet by mouth 2 (Two) Times a Day. 60 tablet 8   • cholecalciferol (VITAMIN D3) 1000 UNITS tablet Take 1,000 Units by mouth daily.     • clopidogrel (PLAVIX) 75 MG tablet Take 1 tablet by mouth Daily. 30 tablet 8   • esomeprazole (nexIUM) 20 MG capsule Take 20 mg by mouth Every Morning Before Breakfast.     • ezetimibe (ZETIA) 10 MG tablet Take 1 tablet by mouth Daily. 30 tablet 8   • loratadine (CLARITIN) 10 MG tablet Take 10 mg by mouth Daily As Needed for Allergies.     • losartan (COZAAR) 50 MG tablet Take 1 tablet by mouth Daily. 30 tablet 8   • nitroglycerin (NITROSTAT) 0.4 MG SL tablet 1 under the tongue as needed for angina, may repeat q5mins for up three doses 25 tablet 1     No current facility-administered medications for this visit.        Amoxicillin; Crestor [rosuvastatin calcium]; Dye fdc red [red dye]; Lipitor [atorvastatin]; Penicillins; Sulfa antibiotics; Tetracyclines & related; Zantac [ranitidine hcl]; and Zocor [simvastatin]    Past Medical History:   Diagnosis Date   • COPD (chronic obstructive pulmonary disease) (CMS/HCC)    • Diabetes mellitus (CMS/HCC)    • H/O colonoscopy with polypectomy    • History of hysterectomy    • Hx of appendectomy    • Hx of cholecystectomy   "  • Hyperlipidemia    • Hypertension    • IHD (ischemic heart disease)    • Seasonal allergies        Social History     Social History   • Marital status:      Spouse name: N/A   • Number of children: N/A   • Years of education: N/A     Occupational History   • Not on file.     Social History Main Topics   • Smoking status: Current Every Day Smoker     Packs/day: 1.00     Types: Cigarettes   • Smokeless tobacco: Never Used   • Alcohol use No   • Drug use: No   • Sexual activity: Not on file     Other Topics Concern   • Not on file     Social History Narrative   • No narrative on file       Family History   Problem Relation Age of Onset   • Asthma Mother    • Stroke Father    • Hypertension Father    • Hyperlipidemia Father    • Heart attack Other        Review of Systems   Constitution: Positive for weight gain (up 7 lb ). Negative for decreased appetite, weakness and malaise/fatigue.   HENT: Negative.    Eyes: Negative.    Cardiovascular: Negative for chest pain, dyspnea on exertion, leg swelling, palpitations and syncope.   Respiratory: Negative for cough and shortness of breath.    Endocrine: Negative.    Hematologic/Lymphatic: Negative.    Skin: Negative.    Musculoskeletal: Negative for falls and myalgias.   Gastrointestinal: Positive for heartburn (improved with Nexium ). Negative for abdominal pain and melena.   Genitourinary: Negative for dysuria and hematuria.   Neurological: Negative for dizziness.   Psychiatric/Behavioral: Negative for altered mental status and depression.   Allergic/Immunologic: Negative.       Diabetes- No  Thyroid-normal    Objective     /84 (BP Location: Right arm)   Pulse 80   Ht 160 cm (62.99\")   Wt 70.3 kg (155 lb)   BMI 27.46 kg/m²     Physical Exam   Constitutional: She is oriented to person, place, and time. She appears well-developed and well-nourished. No distress.   HENT:   Head: Normocephalic.   Eyes: Pupils are equal, round, and reactive to light.   Neck: " Normal range of motion.   Cardiovascular: Normal rate, regular rhythm and intact distal pulses.    Pulmonary/Chest: Effort normal and breath sounds normal. No respiratory distress. She has no wheezes.   Abdominal: Soft. Bowel sounds are normal.   Musculoskeletal: Normal range of motion. She exhibits no edema.   Neurological: She is alert and oriented to person, place, and time.   Skin: Skin is warm and dry. She is not diaphoretic.   Psychiatric: She has a normal mood and affect.   Nursing note and vitals reviewed.     Procedures          Assessment/Plan    Heart rate and blood pressure well controlled. Reviewed labs. LDL remains above goal at 126, but unfortunately she is statin intolerant. Continue Zetia. We discussed PCSK9 monoclonal antibody therapy, but at this time she is not interested. If she changes her mind, she is advised to contact our office and will try to obtain prior authorization. Continue beta blocker and ARB for LV dysfunction. She was encouraged again with smoking cessation. Refills sent for cardiac meds, carvedilol, losartan, Zetia, and Plavix. She is chest pain free, so no work up ordered today. Heart healthy diet, low in saturated fat and sugar recommended. Literature on Mediterranean diet given. Regular walking encouraged. She does have a long driveway with incline and was encouraged to walk daily. She appears stable. We will see her back in six months or sooner if needed.   Nelida was seen today for follow-up and med refill.    Diagnoses and all orders for this visit:    IHD (ischemic heart disease)    Essential hypertension    Pure hypercholesterolemia    Left ventricular dysfunction    History of placement of stent in LAD coronary artery    Controlled type 2 diabetes mellitus without complication, without long-term current use of insulin (CMS/[facility])    Cigarette smoker    Other orders  -     carvedilol (COREG) 3.125 MG tablet; Take 1 tablet by mouth 2 (Two) Times a Day.  -     losartan  (COZAAR) 50 MG tablet; Take 1 tablet by mouth Daily.  -     ezetimibe (ZETIA) 10 MG tablet; Take 1 tablet by mouth Daily.  -     clopidogrel (PLAVIX) 75 MG tablet; Take 1 tablet by mouth Daily.        Patient's Body mass index is 27.46 kg/m². BMI is above normal parameters. Recommendations include: nutrition counseling.       I advised Nelida of the risks of continuing to use tobacco, and I provided her with tobacco cessation educational materials in the After Visit Summary.     During this visit, I spent <3 minutes counseling the patient regarding tobacco cessation.                 Electronically signed by ROSALINDA Santiago,  November 6, 2018 11:47 AM

## 2018-11-06 ENCOUNTER — OFFICE VISIT (OUTPATIENT)
Dept: CARDIOLOGY | Facility: CLINIC | Age: 68
End: 2018-11-06

## 2018-11-06 VITALS
SYSTOLIC BLOOD PRESSURE: 120 MMHG | HEART RATE: 80 BPM | DIASTOLIC BLOOD PRESSURE: 84 MMHG | BODY MASS INDEX: 27.46 KG/M2 | WEIGHT: 155 LBS | HEIGHT: 63 IN

## 2018-11-06 DIAGNOSIS — I10 ESSENTIAL HYPERTENSION: ICD-10-CM

## 2018-11-06 DIAGNOSIS — Z95.5 HISTORY OF PLACEMENT OF STENT IN LAD CORONARY ARTERY: ICD-10-CM

## 2018-11-06 DIAGNOSIS — F17.210 CIGARETTE SMOKER: ICD-10-CM

## 2018-11-06 DIAGNOSIS — I51.9 LEFT VENTRICULAR DYSFUNCTION: ICD-10-CM

## 2018-11-06 DIAGNOSIS — E11.9 CONTROLLED TYPE 2 DIABETES MELLITUS WITHOUT COMPLICATION, WITHOUT LONG-TERM CURRENT USE OF INSULIN (HCC): ICD-10-CM

## 2018-11-06 DIAGNOSIS — E78.00 PURE HYPERCHOLESTEROLEMIA: ICD-10-CM

## 2018-11-06 DIAGNOSIS — I25.9 IHD (ISCHEMIC HEART DISEASE): Primary | ICD-10-CM

## 2018-11-06 PROCEDURE — 99214 OFFICE O/P EST MOD 30 MIN: CPT | Performed by: NURSE PRACTITIONER

## 2018-11-06 RX ORDER — LOSARTAN POTASSIUM 50 MG/1
50 TABLET ORAL DAILY
Qty: 30 TABLET | Refills: 8 | Status: SHIPPED | OUTPATIENT
Start: 2018-11-06 | End: 2019-06-24 | Stop reason: SDUPTHER

## 2018-11-06 RX ORDER — EZETIMIBE 10 MG/1
10 TABLET ORAL DAILY
Qty: 30 TABLET | Refills: 8 | Status: SHIPPED | OUTPATIENT
Start: 2018-11-06 | End: 2019-06-24 | Stop reason: SDUPTHER

## 2018-11-06 RX ORDER — CARVEDILOL 3.12 MG/1
3.12 TABLET ORAL 2 TIMES DAILY
Qty: 60 TABLET | Refills: 8 | Status: SHIPPED | OUTPATIENT
Start: 2018-11-06 | End: 2019-06-24 | Stop reason: SDUPTHER

## 2018-11-06 RX ORDER — CLOPIDOGREL BISULFATE 75 MG/1
75 TABLET ORAL DAILY
Qty: 30 TABLET | Refills: 8 | Status: SHIPPED | OUTPATIENT
Start: 2018-11-06 | End: 2019-06-24 | Stop reason: SDUPTHER

## 2018-11-06 NOTE — PATIENT INSTRUCTIONS
Fat and Cholesterol Restricted Diet  High levels of fat and cholesterol in your blood may lead to various health problems, such as diseases of the heart, blood vessels, gallbladder, liver, and pancreas. Fats are concentrated sources of energy that come in various forms. Certain types of fat, including saturated fat, may be harmful in excess. Cholesterol is a substance needed by your body in small amounts. Your body makes all the cholesterol it needs. Excess cholesterol comes from the food you eat.  When you have high levels of cholesterol and saturated fat in your blood, health problems can develop because the excess fat and cholesterol will gather along the walls of your blood vessels, causing them to narrow. Choosing the right foods will help you control your intake of fat and cholesterol. This will help keep the levels of these substances in your blood within normal limits and reduce your risk of disease.  What is my plan?  Your health care provider recommends that you:  · Limit your fat intake to ______% or less of your total calories per day.  · Limit the amount of cholesterol in your diet to less than _________mg per day.  · Eat 20-30 grams of fiber each day.    What types of fat should I choose?  · Choose healthy fats more often. Choose monounsaturated and polyunsaturated fats, such as olive and canola oil, flaxseeds, walnuts, almonds, and seeds.  · Eat more omega-3 fats. Good choices include salmon, mackerel, sardines, tuna, flaxseed oil, and ground flaxseeds. Aim to eat fish at least two times a week.  · Limit saturated fats. Saturated fats are primarily found in animal products, such as meats, butter, and cream. Plant sources of saturated fats include palm oil, palm kernel oil, and coconut oil.  · Avoid foods with partially hydrogenated oils in them. These contain trans fats. Examples of foods that contain trans fats are stick margarine, some tub margarines, cookies, crackers, and other baked goods.  What  "general guidelines do I need to follow?  These guidelines for healthy eating will help you control your intake of fat and cholesterol:  · Check food labels carefully to identify foods with trans fats or high amounts of saturated fat.  · Fill one half of your plate with vegetables and green salads.  · Fill one fourth of your plate with whole grains. Look for the word \"whole\" as the first word in the ingredient list.  · Fill one fourth of your plate with lean protein foods.  · Limit fruit to two servings a day. Choose fruit instead of juice.  · Eat more foods that contain fiber, such as apples, broccoli, carrots, beans, peas, and barley.  · Eat more home-cooked food and less restaurant, buffet, and fast food.  · Limit or avoid alcohol.  · Limit foods high in starch and sugar.  · Limit fried foods.  · Cook foods using methods other than frying. Baking, boiling, grilling, and broiling are all great options.  · Lose weight if you are overweight. Losing just 5-10% of your initial body weight can help your overall health and prevent diseases such as diabetes and heart disease.    What foods can I eat?  Grains  · Whole grains, such as whole wheat or whole grain breads, crackers, cereals, and pasta. Unsweetened oatmeal, bulgur, barley, quinoa, or brown rice. Corn or whole wheat flour tortillas.  Vegetables  · Fresh or frozen vegetables (raw, steamed, roasted, or grilled). Green salads.  Fruits  · All fresh, canned (in natural juice), or frozen fruits.  Meats and other protein foods  · Ground beef (85% or leaner), grass-fed beef, or beef trimmed of fat. Skinless chicken or turkey. Ground chicken or turkey. Pork trimmed of fat. All fish and seafood. Eggs. Dried beans, peas, or lentils. Unsalted nuts or seeds. Unsalted canned or dry beans.  Dairy  · Low-fat dairy products, such as skim or 1% milk, 2% or reduced-fat cheeses, low-fat ricotta or cottage cheese, or plain low-fat yo  Fats and oils  · Tub margarines without trans " fats. Light or reduced-fat mayonnaise and salad dressings. Avocado. Olive, canola, sesame, or safflower oils. Natural peanut or almond butter (choose ones without added sugar and oil).  The items listed above may not be a complete list of recommended foods or beverages. Contact your dietitian for more options.  Foods to avoid  Grains  · White bread. White pasta. White rice. Cornbread. Bagels, pastries, and croissants. Crackers that contain trans fat.  Vegetables  · White potatoes. Corn. Creamed or fried vegetables. Vegetables in a cheese sauce.  Fruits  · Dried fruits. Canned fruit in light or heavy syrup. Fruit juice.  Meats and other protein foods  · Fatty cuts of meat. Ribs, chicken wings, romeo, sausage, bologna, salami, chitterlings, fatback, hot dogs, bratwurst, and packaged luncheon meats. Liver and organ meats.  Dairy  · Whole or 2% milk, cream, half-and-half, and cream cheese. Whole milk cheeses. Whole-fat or sweetened yogurt. Full-fat cheeses. Nondairy creamers and whipped toppings. Processed cheese, cheese spreads, or cheese curds.  Beverages  · Alcohol. Sweetened drinks (such as sodas, lemonade, and fruit drinks or punches).  Fats and oils  · Butter, stick margarine, lard, shortening, ghee, or romeo fat. Coconut, palm kernel, or palm oils.  Sweets and desserts  · Corn syrup, sugars, honey, and molasses. Candy. Jam and jelly. Syrup. Sweetened cereals. Cookies, pies, cakes, donuts, muffins, and ice cream.  The items listed above may not be a complete list of foods and beverages to avoid. Contact your dietitian for more information.  This information is not intended to replace advice given to you by your health care provider. Make sure you discuss any questions you have with your health care provider.  Document Released: 12/18/2006 Document Revised: 01/08/2016 Document Reviewed: 03/18/2015  [employer] Patient Education © 2018 [employer]

## 2019-06-24 ENCOUNTER — OFFICE VISIT (OUTPATIENT)
Dept: CARDIOLOGY | Facility: CLINIC | Age: 69
End: 2019-06-24

## 2019-06-24 VITALS
HEART RATE: 72 BPM | BODY MASS INDEX: 28.35 KG/M2 | WEIGHT: 160 LBS | HEIGHT: 63 IN | DIASTOLIC BLOOD PRESSURE: 90 MMHG | SYSTOLIC BLOOD PRESSURE: 146 MMHG

## 2019-06-24 DIAGNOSIS — R07.89 CHEST TIGHTNESS: ICD-10-CM

## 2019-06-24 DIAGNOSIS — R06.02 SHORTNESS OF BREATH: ICD-10-CM

## 2019-06-24 DIAGNOSIS — E11.9 CONTROLLED TYPE 2 DIABETES MELLITUS WITHOUT COMPLICATION, WITHOUT LONG-TERM CURRENT USE OF INSULIN (HCC): ICD-10-CM

## 2019-06-24 DIAGNOSIS — I25.9 IHD (ISCHEMIC HEART DISEASE): Primary | ICD-10-CM

## 2019-06-24 DIAGNOSIS — E78.2 MIXED HYPERLIPIDEMIA: ICD-10-CM

## 2019-06-24 DIAGNOSIS — F17.210 CIGARETTE SMOKER: ICD-10-CM

## 2019-06-24 DIAGNOSIS — M25.50 CHRONIC JOINT PAIN: ICD-10-CM

## 2019-06-24 DIAGNOSIS — I10 ESSENTIAL HYPERTENSION: ICD-10-CM

## 2019-06-24 DIAGNOSIS — G89.29 CHRONIC JOINT PAIN: ICD-10-CM

## 2019-06-24 DIAGNOSIS — Z95.5 HISTORY OF PLACEMENT OF STENT IN LAD CORONARY ARTERY: ICD-10-CM

## 2019-06-24 DIAGNOSIS — E66.3 OVERWEIGHT: ICD-10-CM

## 2019-06-24 PROCEDURE — 99214 OFFICE O/P EST MOD 30 MIN: CPT | Performed by: NURSE PRACTITIONER

## 2019-06-24 RX ORDER — CARVEDILOL 3.12 MG/1
3.12 TABLET ORAL 2 TIMES DAILY
Qty: 60 TABLET | Refills: 8 | Status: SHIPPED | OUTPATIENT
Start: 2019-06-24 | End: 2020-01-06 | Stop reason: SDUPTHER

## 2019-06-24 RX ORDER — EZETIMIBE 10 MG/1
10 TABLET ORAL DAILY
Qty: 30 TABLET | Refills: 8 | Status: SHIPPED | OUTPATIENT
Start: 2019-06-24 | End: 2020-06-11

## 2019-06-24 RX ORDER — CLOPIDOGREL BISULFATE 75 MG/1
75 TABLET ORAL DAILY
Qty: 30 TABLET | Refills: 8 | Status: SHIPPED | OUTPATIENT
Start: 2019-06-24 | End: 2020-01-06 | Stop reason: SDUPTHER

## 2019-06-24 RX ORDER — LOSARTAN POTASSIUM 50 MG/1
50 TABLET ORAL DAILY
Qty: 30 TABLET | Refills: 8 | Status: SHIPPED | OUTPATIENT
Start: 2019-06-24 | End: 2019-07-15 | Stop reason: SINTOL

## 2019-06-24 NOTE — PROGRESS NOTES
Chief Complaint   Patient presents with   • Follow-up     For cardiac management. Patient is on aspirin. Got a recall letter on losartan that needs to be changed. Reports that she did have some chest pressure, but states that when she got out of [facility] it went away. Reports that she does have some breathing problems, relates to allergies. Last lab work was done on 10/29/18 per PCP, in chart under media, will be having some done next month. Has not had BP medication today.    • Med Refill     Needs refills on cardiac medications including nitro. 30 day supplies to [facility].       Cardiac Complaints  chest pressure/discomfort and dyspnea      Subjective   Nelida Ford is a 69 y.o. female with HTN, hyperlipidemia, COPD, and IHD diagnosed in 1999 when she had stenting of the LAD. She presented in June of 2014 with acute MI found to have 100% occluded LAD and underwent stenting of the same. Post interventional course was complicated with respiratory distress that slowly improved. At the time of discharge, EF improved to 40%. Echo in March 2016 showed EF stable at 45-50%, WMA in the septum similar to prior. At November 2017 visit she reported chest tightness. Stress and echocardiogram showed anteroseptal infarct with valarie-infarct ischemia and LVEF 45%. Addition of Imdur was recommended. Due to persistent severe headaches, Imdur was changed to Norvasc. Norvasc was stopped due increase in GERD symptoms. She returns today for follow up and reports doing well. She does report an incident of chest pressure and shortness of breath a few weeks ago at [facility], but nothing since then.  She does report some of the breathing issues as related to her allergies/sinus problems.  She denies palpitations or syncope.  She admits labs are followed by PCP and will be rechecked again soon.  Refills of cardiac meds requested for 30 day supply.  She does state she received a letter several months back about losartan needing to be  recalled but she is unsure if this is still the case.  She has not had medication yet today and she states this is why her blood pressure is elevated.  Unfortunately, she is still smoking a half pack of cigarettes daily.            Cardiac History  Past Surgical History:   Procedure Laterality Date   • CARDIAC CATHETERIZATION  1999    stenting   • CARDIAC CATHETERIZATION  05/13/2005    60% LAD and D1   • CARDIAC CATHETERIZATION  06/24/2014    Dr Covington- 100% LAD  3.2x12 Promus   • CARDIOVASCULAR STRESS TEST  03/20/2008    6 min, 97% THR. Negative.   • CARDIOVASCULAR STRESS TEST  08/01/2012    3 min, 87%THR. R/O Septal Ischemia   • CARDIOVASCULAR STRESS TEST  11/16/2017    3 Min, 59 Secs. 86% THR. EF 37%. Anteroseptal Infarct with periinfarct Ischemia.   • ECHO - CONVERTED  03/20/2008    EF >60%   • ECHO - CONVERTED  08/01/2012    EF 60% Septal WMA   • ECHO - CONVERTED  06/24/2014    ([facility]) EF 40%. Septal WMA. RVSP- 40mmHg   • ECHO - CONVERTED  03/15/2016    EF 45-50%, apical WMA, similar to prior   • ECHO - CONVERTED  11/16/2017    EF 45%. Anteroseptal WMA       Current Outpatient Medications   Medication Sig Dispense Refill   • aspirin 81 MG EC tablet Take 81 mg by mouth daily.     • carvedilol (COREG) 3.125 MG tablet Take 1 tablet by mouth 2 (Two) Times a Day. 60 tablet 8   • cholecalciferol (VITAMIN D3) 1000 UNITS tablet Take 1,000 Units by mouth daily.     • clopidogrel (PLAVIX) 75 MG tablet Take 1 tablet by mouth Daily. 30 tablet 8   • ezetimibe (ZETIA) 10 MG tablet Take 1 tablet by mouth Daily. 30 tablet 8   • loratadine (CLARITIN) 10 MG tablet Take 10 mg by mouth Daily As Needed for Allergies.     • losartan (COZAAR) 50 MG tablet Take 1 tablet by mouth Daily. 30 tablet 8   • nitroglycerin (NITROSTAT) 0.4 MG SL tablet 1 under the tongue as needed for angina, may repeat q5mins for up three doses 25 tablet 1     No current facility-administered medications for this visit.        Amoxicillin; Crestor  [rosuvastatin calcium]; Dye fdc red [red dye]; Lipitor [atorvastatin]; Penicillins; Sulfa antibiotics; Tetracyclines & related; Zantac [ranitidine hcl]; and Zocor [simvastatin]    Past Medical History:   Diagnosis Date   • COPD (chronic obstructive pulmonary disease) (CMS/[facility])    • Diabetes mellitus (CMS/[facility])    • H/O colonoscopy with polypectomy    • History of hysterectomy    • Hx of appendectomy    • Hx of cholecystectomy    • Hyperlipidemia    • Hypertension    • IHD (ischemic heart disease)    • Seasonal allergies        Social History     Socioeconomic History   • Marital status:      Spouse name: Not on file   • Number of children: Not on file   • Years of education: Not on file   • Highest education level: Not on file   Tobacco Use   • Smoking status: Current Every Day Smoker     Packs/day: 0.50     Types: Cigarettes   • Smokeless tobacco: Never Used   Substance and Sexual Activity   • Alcohol use: No   • Drug use: No       Family History   Problem Relation Age of Onset   • Asthma Mother    • Stroke Father    • Hypertension Father    • Hyperlipidemia Father    • Heart attack Other        Review of Systems   Constitution: Negative for weakness and malaise/fatigue.   Cardiovascular: Positive for chest pain and dyspnea on exertion. Negative for claudication, irregular heartbeat, leg swelling, near-syncope, orthopnea, palpitations and syncope.   Respiratory: Positive for shortness of breath. Negative for cough and wheezing.    Musculoskeletal: Positive for joint pain and myalgias. Negative for back pain.   Gastrointestinal: Negative for anorexia, heartburn, nausea and vomiting.   Genitourinary: Negative for dysuria, hematuria, hesitancy and nocturia.   Neurological: Negative for dizziness, focal weakness, light-headedness and loss of balance.   Psychiatric/Behavioral: Negative for depression and memory loss. The patient is nervous/anxious.            Objective     /90 (BP Location: Right arm)    "Pulse 72   Ht 160 cm (62.99\")   Wt 72.6 kg (160 lb)   BMI 28.35 kg/m²     Physical Exam   Constitutional: She is oriented to person, place, and time. She appears well-developed and well-nourished.   HENT:   Head: Normocephalic.   Eyes: EOM are normal. Pupils are equal, round, and reactive to light.   Neck: Normal range of motion. Neck supple.   Cardiovascular: Normal rate and regular rhythm.   Murmur heard.  Pulmonary/Chest: Effort normal and breath sounds normal.   Abdominal: Soft.   Musculoskeletal: Normal range of motion.   Neurological: She is alert and oriented to person, place, and time.   Skin: Skin is warm and dry.   Psychiatric: She has a normal mood and affect. Her behavior is normal.       Procedures    Assessment/Plan     HR is normal and rhythm regular.  Blood pressure is elevated but she has not yet had any medication for HTN management, she was urged to take it when she got home.  For now, current continued.  We discussed cardiac cath as she does still have some chest tightness from time to time and is always short of breath, which she attributes to her lungs.  She declines cath at this time as she feels symptoms are not bad enough to warrant.  If symptoms should worsen, patient aware to call to schedule cardiac cath.  DAPT therapy continued for history of IHD with stenting as bleeding and bruising denied. Use of SL NTG encouraged to be used prn with chest pain. She has been intolerant to statin therapy for hyperlipidemia management.  She will be urged to continue with zetia in regards.  Las she states are done with your office including FLP for hyperlipidemia management.  Could we have next for our review?  Refills of cardiac meds sent per request.  Long discussion once again on smoking cessation and the benefits of quitting, but she does not feel ready at present.  Her main concern was all over joint pain that is symmetric.  She reports she would like to discuss auto immune causes and labs with " you.  BMI noted at 28.35, good cardiac diet with limited carbs, calories, and activity as tolerated advised.  6 month follow up recommended or sooner if needed.        Problems Addressed this Visit        Cardiovascular and Mediastinum    HTN (hypertension)    Relevant Medications    losartan (COZAAR) 50 MG tablet    carvedilol (COREG) 3.125 MG tablet    Hyperlipidemia    Relevant Medications    ezetimibe (ZETIA) 10 MG tablet    IHD (ischemic heart disease) - Primary    Relevant Medications    clopidogrel (PLAVIX) 75 MG tablet    carvedilol (COREG) 3.125 MG tablet       Endocrine    Controlled type 2 diabetes mellitus without complication, without long-term current use of insulin (CMS/[facility])       Other    History of placement of stent in LAD coronary artery    Cigarette smoker      Other Visit Diagnoses     Chest tightness        Shortness of breath        Overweight        Chronic joint pain              Patient's Body mass index is 28.35 kg/m². BMI is above normal parameters. Recommendations include: nutrition counseling.          Electronically signed by ROSALINDA Baron June 24, 2019 5:46 PM

## 2019-07-15 ENCOUNTER — TELEPHONE (OUTPATIENT)
Dept: CARDIOLOGY | Facility: CLINIC | Age: 69
End: 2019-07-15

## 2019-07-15 RX ORDER — OLMESARTAN MEDOXOMIL 20 MG/1
20 TABLET ORAL DAILY
Qty: 30 TABLET | Refills: 6 | Status: SHIPPED | OUTPATIENT
Start: 2019-07-15 | End: 2019-07-16 | Stop reason: ALTCHOICE

## 2019-07-15 NOTE — TELEPHONE ENCOUNTER
I spoke with patient made her aware of new order and that it  Is sent to [facility] pharmacy.

## 2019-07-15 NOTE — TELEPHONE ENCOUNTER
Patient called and has requested Losartan be changed d/t recall . Losartan 50 mg daily .  Had last follow up 6-24-19 .

## 2019-07-16 ENCOUNTER — TELEPHONE (OUTPATIENT)
Dept: CARDIOLOGY | Facility: CLINIC | Age: 69
End: 2019-07-16

## 2019-07-16 RX ORDER — LISINOPRIL 10 MG/1
10 TABLET ORAL DAILY
Qty: 30 TABLET | Refills: 11 | Status: SHIPPED | OUTPATIENT
Start: 2019-07-16 | End: 2020-01-06 | Stop reason: ALTCHOICE

## 2019-07-16 NOTE — TELEPHONE ENCOUNTER
Script for lisinopril 10 mg daily sent to [facility] with 30 tablets and 11 refills.

## 2019-07-16 NOTE — TELEPHONE ENCOUNTER
Patient states that she is not allergic to lisinopril that she knows of and is willing to try. How do you want me to send in?

## 2019-08-10 NOTE — TELEPHONE ENCOUNTER
Okay. That is fine, based on the message, I thought she did not losartan as it was recalled.  She can do, an ACE inhibitor like lisinopril if not allergic.  Please let me know.   coagulation(Bleeding disorder R/T clinical cond/anti-coags)/other

## 2020-01-06 ENCOUNTER — OFFICE VISIT (OUTPATIENT)
Dept: CARDIOLOGY | Facility: CLINIC | Age: 70
End: 2020-01-06

## 2020-01-06 VITALS
HEART RATE: 64 BPM | HEIGHT: 63 IN | BODY MASS INDEX: 27.64 KG/M2 | DIASTOLIC BLOOD PRESSURE: 74 MMHG | WEIGHT: 156 LBS | SYSTOLIC BLOOD PRESSURE: 120 MMHG

## 2020-01-06 DIAGNOSIS — Z95.5 HISTORY OF PLACEMENT OF STENT IN LAD CORONARY ARTERY: ICD-10-CM

## 2020-01-06 DIAGNOSIS — I25.9 IHD (ISCHEMIC HEART DISEASE): ICD-10-CM

## 2020-01-06 DIAGNOSIS — E11.9 CONTROLLED TYPE 2 DIABETES MELLITUS WITHOUT COMPLICATION, WITHOUT LONG-TERM CURRENT USE OF INSULIN (HCC): ICD-10-CM

## 2020-01-06 DIAGNOSIS — E66.3 OVERWEIGHT: ICD-10-CM

## 2020-01-06 DIAGNOSIS — F17.210 CIGARETTE SMOKER: ICD-10-CM

## 2020-01-06 DIAGNOSIS — I10 ESSENTIAL HYPERTENSION: Primary | ICD-10-CM

## 2020-01-06 DIAGNOSIS — E78.2 MIXED HYPERLIPIDEMIA: ICD-10-CM

## 2020-01-06 PROCEDURE — 99214 OFFICE O/P EST MOD 30 MIN: CPT | Performed by: NURSE PRACTITIONER

## 2020-01-06 RX ORDER — AMLODIPINE BESYLATE 5 MG/1
5 TABLET ORAL DAILY
COMMUNITY
End: 2021-01-05

## 2020-01-06 RX ORDER — NITROGLYCERIN 0.4 MG/1
TABLET SUBLINGUAL
Qty: 25 TABLET | Refills: 1 | Status: SHIPPED | OUTPATIENT
Start: 2020-01-06 | End: 2021-09-17 | Stop reason: SDUPTHER

## 2020-01-06 RX ORDER — CLOPIDOGREL BISULFATE 75 MG/1
75 TABLET ORAL DAILY
Qty: 30 TABLET | Refills: 8 | Status: SHIPPED | OUTPATIENT
Start: 2020-01-06 | End: 2021-01-05 | Stop reason: SDUPTHER

## 2020-01-06 RX ORDER — CARVEDILOL 3.12 MG/1
3.12 TABLET ORAL 2 TIMES DAILY
Qty: 60 TABLET | Refills: 8 | Status: SHIPPED | OUTPATIENT
Start: 2020-01-06 | End: 2020-11-25

## 2020-01-06 NOTE — PROGRESS NOTES
Chief Complaint   Patient presents with   • Follow-up     For cardiac management. Patient is on aspirin. PCP changed her to amlodipine from lisinopril. States that she has had a twinge of chest pain. Reports that she has had some shortness of breath, did have CXR and they did not see anything in lungs.    • Med Refill     Needs refills on cardiac medications. [facility] supplies [facility]. Brought medication list with visit.        Cardiac Complaints  chest pressure/discomfort and dyspnea      Subjective   Nelida Ford is a 69 y.o. female with HTN, hyperlipidemia, COPD, and IHD diagnosed in 1999 when she had stenting of the LAD. She presented in June of 2014 with acute MI found to have 100% occluded LAD and underwent stenting of the same. Post interventional course was complicated with respiratory distress that slowly improved. At the time of discharge, EF improved to 40%. Echo in March 2016 showed EF stable at 45-50%, WMA in the septum similar to prior. At November 2017 visit she reported chest tightness. Stress and echocardiogram showed anteroseptal infarct with valarie-infarct ischemia and LVEF 45%. Addition of Imdur was recommended. Due to persistent severe headaches, Imdur was changed to Norvasc. Norvasc was later stopped due increase in GERD symptoms.      She comes today for follow up and denies any new concerns. She does have continued shortness of breath but no worse than prior. She admits to recent chest xray to evaluate as she has history lung nodules.  She reports chest xray showed nodules remained the same, no changes. She admits to one episode of chest pain that she described as a twinge as she working at the [employer]. Patient reports she had been coughing a great deal at the [employer] and thinks this could have been related. She states she rested and the pain went away, patient denies any use of NTG in regards.  Labs she reports are followed by PCP and she will follow with him soon. Cardiac refills  requested for 30 day supply.  It appears her norvasc was added back instead of lisinopril therapy.  She does report she is smoking more recently due to stress.  She has a plan to start trying to quit in February.        Cardiac History  Past Surgical History:   Procedure Laterality Date   • CARDIAC CATHETERIZATION  1999    stenting   • CARDIAC CATHETERIZATION  05/13/2005    60% LAD and D1   • CARDIAC CATHETERIZATION  06/24/2014    Dr Covington- 100% LAD  3.2x12 Promus   • CARDIOVASCULAR STRESS TEST  03/20/2008    6 min, 97% THR. Negative.   • CARDIOVASCULAR STRESS TEST  08/01/2012    3 min, 87%THR. R/O Septal Ischemia   • CARDIOVASCULAR STRESS TEST  11/16/2017    3 Min, 59 Secs. 86% THR. EF 37%. Anteroseptal Infarct with periinfarct Ischemia.   • ECHO - CONVERTED  03/20/2008    EF >60%   • ECHO - CONVERTED  08/01/2012    EF 60% Septal WMA   • ECHO - CONVERTED  06/24/2014    ([facility]) EF 40%. Septal WMA. RVSP- 40mmHg   • ECHO - CONVERTED  03/15/2016    EF 45-50%, apical WMA, similar to prior   • ECHO - CONVERTED  11/16/2017    EF 45%. Anteroseptal WMA       Current Outpatient Medications   Medication Sig Dispense Refill   • amLODIPine (NORVASC) 5 MG tablet Take 5 mg by mouth Daily.     • aspirin 81 MG EC tablet Take 81 mg by mouth daily.     • carvedilol (COREG) 3.125 MG tablet Take 1 tablet by mouth 2 (Two) Times a Day. 60 tablet 8   • cholecalciferol (VITAMIN D3) 1000 UNITS tablet Take 1,000 Units by mouth daily.     • clopidogrel (PLAVIX) 75 MG tablet Take 1 tablet by mouth Daily. 30 tablet 8   • ezetimibe (ZETIA) 10 MG tablet Take 1 tablet by mouth Daily. 30 tablet 8   • loratadine (CLARITIN) 10 MG tablet Take 10 mg by mouth Daily As Needed for Allergies.     • nitroglycerin (NITROSTAT) 0.4 MG SL tablet 1 under the tongue as needed for angina, may repeat q5mins for up three doses 25 tablet 1     No current facility-administered medications for this visit.        Amoxicillin; Crestor [rosuvastatin calcium]; Dye fdc red  "[red dye]; Lipitor [atorvastatin]; Penicillins; Sulfa antibiotics; Tetracyclines & related; Zantac [ranitidine hcl]; and Zocor [simvastatin]    Past Medical History:   Diagnosis Date   • COPD (chronic obstructive pulmonary disease) (CMS/[facility])    • Diabetes mellitus (CMS/HCC)    • H/O colonoscopy with polypectomy    • History of hysterectomy    • Hx of appendectomy    • Hx of cholecystectomy    • Hyperlipidemia    • Hypertension    • IHD (ischemic heart disease)    • Seasonal allergies        Social History     Socioeconomic History   • Marital status:      Spouse name: Not on file   • Number of children: Not on file   • Years of education: Not on file   • Highest education level: Not on file   Tobacco Use   • Smoking status: Current Every Day Smoker     Packs/day: 1.00     Types: Cigarettes   • Smokeless tobacco: Never Used   Substance and Sexual Activity   • Alcohol use: No   • Drug use: No       Family History   Problem Relation Age of Onset   • Asthma Mother    • Stroke Father    • Hypertension Father    • Hyperlipidemia Father    • Heart attack Other        Review of Systems   Constitution: Negative for malaise/fatigue and night sweats.   Cardiovascular: Positive for chest pain and dyspnea on exertion. Negative for claudication, irregular heartbeat, leg swelling, near-syncope, orthopnea, palpitations and syncope.   Respiratory: Positive for shortness of breath. Negative for cough and wheezing.    Musculoskeletal: Negative for back pain, joint pain and joint swelling.   Gastrointestinal: Negative for anorexia, heartburn, nausea and vomiting.   Genitourinary: Negative for dysuria, hematuria, hesitancy, nocturia and urgency.   Neurological: Negative for dizziness, light-headedness and loss of balance.   Psychiatric/Behavioral: Negative for depression and memory loss. The patient is not nervous/anxious.            Objective     /74 (BP Location: Left arm)   Pulse 64   Ht 160 cm (62.99\")   Wt 70.8 kg " (156 lb)   BMI 27.64 kg/m²     Physical Exam   Constitutional: She is oriented to person, place, and time. She appears well-developed and well-nourished.   HENT:   Head: Normocephalic and atraumatic.   Eyes: Pupils are equal, round, and reactive to light. EOM are normal.   Neck: Normal range of motion. Neck supple.   Cardiovascular: Normal rate and regular rhythm.   Murmur heard.  Pulmonary/Chest: Effort normal and breath sounds normal.   Abdominal: Soft.   Musculoskeletal: Normal range of motion.   Neurological: She is alert and oriented to person, place, and time.   Skin: Skin is warm and dry.   Psychiatric: She has a normal mood and affect. Her behavior is normal.       Procedures    Assessment/Plan     IHD:  Patient continues on DAPT therapy and tolerates well. Bleeding and bruising denied. Current advised. Repeat workup discussed once again as it has been 3 years since last testing and most recent stress in 2017 showed ischemia.  Patient declines.  She will consider at next visit.    HTN:  Well managed on current. No adjustment to current coreg or norvasc therapy recommended. She states norvasc was added in place of lisinopril for cough?      DM:  Managed with diet alone.  No recent AIC available for our records but she admits you are following.    Hyperlipidemia:  Patient remains on zetia therapy as she has been statin intolerant. No recent FLP available, but she reports you are following in regards. Could we have most recent FLP for our review?  For now, same advised.    Tobacco abuse:  Patient unfortunately smoking at least a ppd.  She admits she and her  currently have a quit date on the calender. She does not want any smoking cessation aides at present.     BMI noted at 27.64, good cardiac diet with limited carbs, calories, and activity as tolerated advised.     Refills sent per request.    6 month follow up scheduled with patient urged to call with concerns so further workup/sooner appointment may  be made.                Problems Addressed this Visit        Cardiovascular and Mediastinum    HTN (hypertension) - Primary    Relevant Medications    amLODIPine (NORVASC) 5 MG tablet    carvedilol (COREG) 3.125 MG tablet    Hyperlipidemia    IHD (ischemic heart disease)    Relevant Medications    amLODIPine (NORVASC) 5 MG tablet    carvedilol (COREG) 3.125 MG tablet    clopidogrel (PLAVIX) 75 MG tablet    nitroglycerin (NITROSTAT) 0.4 MG SL tablet       Endocrine    Controlled type 2 diabetes mellitus without complication, without long-term current use of insulin (CMS/[facility])       Other    History of placement of stent in LAD coronary artery    Cigarette smoker      Other Visit Diagnoses     Overweight              Patient's Body mass index is 27.64 kg/m². BMI is above normal parameters. Recommendations include: nutrition counseling.      Nelida Ford  reports that she has been smoking cigarettes. She has been smoking about 1.00 pack per day. She has never used smokeless tobacco. She is not ready to quit at present.             Electronically signed by ROSALINDA Baron January 6, 2020 3:56 PM

## 2020-06-11 RX ORDER — EZETIMIBE 10 MG/1
TABLET ORAL
Qty: 30 TABLET | Refills: 1 | Status: SHIPPED | OUTPATIENT
Start: 2020-06-11 | End: 2021-01-05

## 2020-07-06 ENCOUNTER — OFFICE VISIT (OUTPATIENT)
Dept: CARDIOLOGY | Facility: CLINIC | Age: 70
End: 2020-07-06

## 2020-07-06 VITALS
HEART RATE: 76 BPM | HEIGHT: 63 IN | WEIGHT: 159.8 LBS | BODY MASS INDEX: 28.31 KG/M2 | SYSTOLIC BLOOD PRESSURE: 140 MMHG | DIASTOLIC BLOOD PRESSURE: 80 MMHG | TEMPERATURE: 97.9 F

## 2020-07-06 DIAGNOSIS — E78.2 MIXED HYPERLIPIDEMIA: ICD-10-CM

## 2020-07-06 DIAGNOSIS — I51.9 LEFT VENTRICULAR DYSFUNCTION: ICD-10-CM

## 2020-07-06 DIAGNOSIS — F17.210 CIGARETTE SMOKER: Primary | ICD-10-CM

## 2020-07-06 DIAGNOSIS — I25.2 OLD MI (MYOCARDIAL INFARCTION): ICD-10-CM

## 2020-07-06 DIAGNOSIS — I10 ESSENTIAL HYPERTENSION: ICD-10-CM

## 2020-07-06 DIAGNOSIS — I25.9 IHD (ISCHEMIC HEART DISEASE): ICD-10-CM

## 2020-07-06 PROCEDURE — 99214 OFFICE O/P EST MOD 30 MIN: CPT | Performed by: NURSE PRACTITIONER

## 2020-07-06 RX ORDER — AMLODIPINE BESYLATE 5 MG/1
5 TABLET ORAL DAILY
Qty: 30 TABLET | Refills: 8 | Status: CANCELLED | OUTPATIENT
Start: 2020-07-06

## 2020-07-06 RX ORDER — PANTOPRAZOLE SODIUM 40 MG/1
40 TABLET, DELAYED RELEASE ORAL DAILY PRN
COMMUNITY

## 2020-07-06 RX ORDER — CHLORTHALIDONE 25 MG/1
25 TABLET ORAL DAILY
Qty: 30 TABLET | Refills: 11 | Status: SHIPPED | OUTPATIENT
Start: 2020-07-06 | End: 2021-08-02

## 2020-07-06 NOTE — PATIENT INSTRUCTIONS
For more information:    Quit Now [place]  1-800-QUIT-NOW  https://[place].quitlogix.org/en-[place]/  Steps to Quit Smoking  Smoking tobacco can be harmful to your health and can affect almost every organ in your body. Smoking puts you, and those around you, at risk for developing many serious chronic diseases. Quitting smoking is difficult, but it is one of the best things that you can do for your health. It is never too late to quit.  What are the benefits of quitting smoking?  When you quit smoking, you lower your risk of developing serious diseases and conditions, such as:  · Lung cancer or lung disease, such as COPD.  · Heart disease.  · Stroke.  · Heart attack.  · Infertility.  · Osteoporosis and bone fractures.  Additionally, symptoms such as coughing, wheezing, and shortness of breath may get better when you quit. You may also find that you get sick less often because your body is stronger at fighting off colds and infections. If you are pregnant, quitting smoking can help to reduce your chances of having a baby of low birth weight.  How do I get ready to quit?  When you decide to quit smoking, create a plan to make sure that you are successful. Before you quit:  · Pick a date to quit. Set a date within the next two weeks to give you time to prepare.  · Write down the reasons why you are quitting. Keep this list in places where you will see it often, such as on your bathroom mirror or in your car or wallet.  · Identify the people, places, things, and activities that make you want to smoke (triggers) and avoid them. Make sure to take these actions:  ¨ Throw away all cigarettes at home, at work, and in your car.  ¨ Throw away smoking accessories, such as ashtrays and lighters.  ¨ Clean your car and make sure to empty the ashtray.  ¨ Clean your home, including curtains and carpets.  · Tell your family, friends, and coworkers that you are quitting. Support from your loved ones can make quitting easier.  · Talk with  your health care provider about your options for quitting smoking.  · Find out what treatment options are covered by your health insurance.  What strategies can I use to quit smoking?  Talk with your healthcare provider about different strategies to quit smoking. Some strategies include:  · Quitting smoking altogether instead of gradually lessening how much you smoke over a period of time. Research shows that quitting “cold turkey” is more successful than gradually quitting.  · Attending in-person counseling to help you build problem-solving skills. You are more likely to have success in quitting if you attend several counseling sessions. Even short sessions of 10 minutes can be effective.  · Finding resources and support systems that can help you to quit smoking and remain smoke-free after you quit. These resources are most helpful when you use them often. They can include:  ¨ Online chats with a counselor.  ¨ Telephone quitlines.  ¨ Printed self-help materials.  ¨ Support groups or group counseling.  ¨ Text messaging programs.  ¨ Mobile phone applications.  · Taking medicines to help you quit smoking. (If you are pregnant or breastfeeding, talk with your health care provider first.) Some medicines contain nicotine and some do not. Both types of medicines help with cravings, but the medicines that include nicotine help to relieve withdrawal symptoms. Your health care provider may recommend:  ¨ Nicotine patches, gum, or lozenges.  ¨ Nicotine inhalers or sprays.  ¨ Non-nicotine medicine that is taken by mouth.  Talk with your health care provider about combining strategies, such as taking medicines while you are also receiving in-person counseling. Using these two strategies together makes you more likely to succeed in quitting than if you used either strategy on its own.  If you are pregnant or breastfeeding, talk with your health care provider about finding counseling or other support strategies to quit smoking. Do  not take medicine to help you quit smoking unless told to do so by your health care provider.  What things can I do to make it easier to quit?  Quitting smoking might feel overwhelming at first, but there is a lot that you can do to make it easier. Take these important actions:  · Reach out to your family and friends and ask that they support and encourage you during this time. Call telephone quitlines, reach out to support groups, or work with a counselor for support.  · Ask people who smoke to avoid smoking around you.  · Avoid places that trigger you to smoke, such as bars, parties, or smoke-break areas at work.  · Spend time around people who do not smoke.  · Lessen stress in your life, because stress can be a smoking trigger for some people. To lessen stress, try:  ¨ Exercising regularly.  ¨ Deep-breathing exercises.  ¨ Yoga.  ¨ Meditating.  ¨ Performing a body scan. This involves closing your eyes, scanning your body from head to toe, and noticing which parts of your body are particularly tense. Purposefully relax the muscles in those areas.  · Download or purchase mobile phone or tablet apps (applications) that can help you stick to your quit plan by providing reminders, tips, and encouragement. There are many free apps, such as QuitGuide from the CDC (Centers for Disease Control and Prevention). You can find other support for quitting smoking (smoking cessation) through smokefree.gov and other websites.  How will I feel when I quit smoking?  Within the first 24 hours of quitting smoking, you may start to feel some withdrawal symptoms. These symptoms are usually most noticeable 2-3 days after quitting, but they usually do not last beyond 2-3 weeks. Changes or symptoms that you might experience include:  · Mood swings.  · Restlessness, anxiety, or irritation.  · Difficulty concentrating.  · Dizziness.  · Strong cravings for sugary foods in addition to nicotine.  · Mild weight  "gain.  · Constipation.  · Nausea.  · Coughing or a sore throat.  · Changes in how your medicines work in your body.  · A depressed mood.  · Difficulty sleeping (insomnia).  After the first 2-3 weeks of quitting, you may start to notice more positive results, such as:  · Improved sense of smell and taste.  · Decreased coughing and sore throat.  · Slower heart rate.  · Lower blood pressure.  · Clearer skin.  · The ability to breathe more easily.  · Fewer sick days.  Quitting smoking is very challenging for most people. Do not get discouraged if you are not successful the first time. Some people need to make many attempts to quit before they achieve long-term success. Do your best to stick to your quit plan, and talk with your health care provider if you have any questions or concerns.  This information is not intended to replace advice given to you by your health care provider. Make sure you discuss any questions you have with your health care provider.  Document Released: 12/12/2002 Document Revised: 08/15/2017 Document Reviewed: 05/03/2016  [employer] Interactive Patient Education © 2017 [employer]  DASH Eating Plan  DASH stands for \"Dietary Approaches to Stop Hypertension.\" The DASH eating plan is a healthy eating plan that has been shown to reduce high blood pressure (hypertension). It may also reduce your risk for type 2 diabetes, heart disease, and stroke. The DASH eating plan may also help with weight loss.  What are tips for following this plan?    General guidelines  · Avoid eating more than 2,300 mg (milligrams) of salt (sodium) a day. If you have hypertension, you may need to reduce your sodium intake to 1,500 mg a day.  · Limit alcohol intake to no more than 1 drink a day for nonpregnant women and 2 drinks a day for men. One drink equals 12 oz of beer, 5 oz of wine, or 1½ oz of hard liquor.  · Work with your health care provider to maintain a healthy body weight or to lose weight. Ask what an ideal weight " "is for you.  · Get at least 30 minutes of exercise that causes your heart to beat faster (aerobic exercise) most days of the week. Activities may include walking, swimming, or biking.  · Work with your health care provider or diet and nutrition specialist (dietitian) to adjust your eating plan to your individual calorie needs.  Reading food labels    · Check food labels for the amount of sodium per serving. Choose foods with less than 5 percent of the Daily Value of sodium. Generally, foods with less than 300 mg of sodium per serving fit into this eating plan.  · To find whole grains, look for the word \"whole\" as the first word in the ingredient list.  Shopping  · Buy products labeled as \"low-sodium\" or \"no salt added.\"  · Buy fresh foods. Avoid canned foods and premade or frozen meals.  Cooking  · Avoid adding salt when cooking. Use salt-free seasonings or herbs instead of table salt or sea salt. Check with your health care provider or pharmacist before using salt substitutes.  · Do not moss foods. Cook foods using healthy methods such as baking, boiling, grilling, and broiling instead.  · Cook with heart-healthy oils, such as olive, canola, soybean, or sunflower oil.  Meal planning  · Eat a balanced diet that includes:  ? 5 or more servings of fruits and vegetables each day. At each meal, try to fill half of your plate with fruits and vegetables.  ? Up to 6-8 servings of whole grains each day.  ? Less than 6 oz of lean meat, poultry, or fish each day. A 3-oz serving of meat is about the same size as a deck of cards. One egg equals 1 oz.  ? 2 servings of low-fat dairy each day.  ? A serving of nuts, seeds, or beans 5 times each week.  ? Heart-healthy fats. Healthy fats called Omega-3 fatty acids are found in foods such as flaxseeds and coldwater fish, like sardines, salmon, and mackerel.  · Limit how much you eat of the following:  ? Canned or prepackaged foods.  ? Food that is high in trans fat, such as fried " foods.  ? Food that is high in saturated fat, such as fatty meat.  ? Sweets, desserts, sugary drinks, and other foods with added sugar.  ? Full-fat dairy products.  · Do not salt foods before eating.  · Try to eat at least 2 vegetarian meals each week.  · Eat more home-cooked food and less restaurant, buffet, and fast food.  · When eating at a restaurant, ask that your food be prepared with less salt or no salt, if possible.  What foods are recommended?  The items listed may not be a complete list. Talk with your dietitian about what dietary choices are best for you.  Grains  Whole-grain or whole-wheat bread. Whole-grain or whole-wheat pasta. Brown rice. Oatmeal. Quinoa. Bulgur. Whole-grain and low-sodium cereals. Renay bread. Low-fat, low-sodium crackers. Whole-wheat flour tortillas.  Vegetables  Fresh or frozen vegetables (raw, steamed, roasted, or grilled). Low-sodium or reduced-sodium tomato and vegetable juice. Low-sodium or reduced-sodium tomato sauce and tomato paste. Low-sodium or reduced-sodium canned vegetables.  Fruits  All fresh, dried, or frozen fruit. Canned fruit in natural juice (without added sugar).  Meat and other protein foods  Skinless chicken or turkey. Ground chicken or turkey. Pork with fat trimmed off. Fish and seafood. Egg whites. Dried beans, peas, or lentils. Unsalted nuts, nut butters, and seeds. Unsalted canned beans. Lean cuts of beef with fat trimmed off. Low-sodium, lean deli meat.  Dairy  Low-fat (1%) or fat-free (skim) milk. Fat-free, low-fat, or reduced-fat cheeses. Nonfat, low-sodium ricotta or cottage cheese. Low-fat or nonfat yogurt. Low-fat, low-sodium cheese.  Fats and oils  Soft margarine without trans fats. Vegetable oil. Low-fat, reduced-fat, or light mayonnaise and salad dressings (reduced-sodium). Canola, safflower, olive, soybean, and sunflower oils. Avocado.  Seasoning and other foods  Herbs. Spices. Seasoning mixes without salt. Unsalted popcorn and pretzels. Fat-free  sweets.  What foods are not recommended?  The items listed may not be a complete list. Talk with your dietitian about what dietary choices are best for you.  Grains  Baked goods made with fat, such as croissants, muffins, or some breads. Dry pasta or rice meal packs.  Vegetables  Creamed or fried vegetables. Vegetables in a cheese sauce. Regular canned vegetables (not low-sodium or reduced-sodium). Regular canned tomato sauce and paste (not low-sodium or reduced-sodium). Regular tomato and vegetable juice (not low-sodium or reduced-sodium). Pickles. Olives.  Fruits  Canned fruit in a light or heavy syrup. Fried fruit. Fruit in cream or butter sauce.  Meat and other protein foods  Fatty cuts of meat. Ribs. Fried meat. Bueno. Sausage. Bologna and other processed lunch meats. Salami. Fatback. Hotdogs. Bratwurst. Salted nuts and seeds. Canned beans with added salt. Canned or smoked fish. Whole eggs or egg yolks. Chicken or turkey with skin.  Dairy  Whole or 2% milk, cream, and half-and-half. Whole or full-fat cream cheese. Whole-fat or sweetened yogurt. Full-fat cheese. Nondairy creamers. Whipped toppings. Processed cheese and cheese spreads.  Fats and oils  Butter. Stick margarine. Lard. Shortening. Ghee. Bueno fat. Tropical oils, such as coconut, palm kernel, or palm oil.  Seasoning and other foods  Salted popcorn and pretzels. Onion salt, garlic salt, seasoned salt, table salt, and sea salt. Worcestershire sauce. Tartar sauce. Barbecue sauce. Teriyaki sauce. Soy sauce, including reduced-sodium. Steak sauce. Canned and packaged gravies. Fish sauce. Oyster sauce. Cocktail sauce. Horseradish that you find on the shelf. Ketchup. Mustard. Meat flavorings and tenderizers. Bouillon cubes. Hot sauce and Tabasco sauce. Premade or packaged marinades. Premade or packaged taco seasonings. Relishes. Regular salad dressings.  Where to find more information:  · National Heart, [employer] [place]:  www.nhlbi.nih.gov  · American Heart Association: www.heart.org  Summary  · The DASH eating plan is a healthy eating plan that has been shown to reduce high blood pressure (hypertension). It may also reduce your risk for type 2 diabetes, heart disease, and stroke.  · With the DASH eating plan, you should limit salt (sodium) intake to 2,300 mg a day. If you have hypertension, you may need to reduce your sodium intake to 1,500 mg a day.  · When on the DASH eating plan, aim to eat more fresh fruits and vegetables, whole grains, lean proteins, low-fat dairy, and heart-healthy fats.  · Work with your health care provider or diet and nutrition specialist (dietitian) to adjust your eating plan to your individual calorie needs.  This information is not intended to replace advice given to you by your health care provider. Make sure you discuss any questions you have with your health care provider.  Document Released: 12/06/2012 Document Revised: 11/30/2018 Document Reviewed: 12/11/2017  [employer] Patient Education © 2020 [employer]

## 2020-07-06 NOTE — PROGRESS NOTES
Chief Complaint   Patient presents with   • Follow-up     Cardiac management  . Had labs per PCP about 3 weeks . Continues to smoke    • Shortness of Breath     Has new diagnosis of COPD   • Med Refill     Needs refills on Amlodipine  [facility] [place] [facility] [place] . She reports that she cannot take generic of Zetia , breanna said PCP had sent in script and working on PA for it.       Estelle Ford is a 70 y.o. female  with HTN, hyperlipidemia, COPD, and IHD diagnosed in 1999 when she had stenting of the LAD. She presented in June of 2014 with acute MI found to have 100% occluded LAD and underwent stenting of the same. Post interventional course was complicated with respiratory distress that slowly improved. At the time of discharge, EF improved to 40%. Echo in March 2016 showed EF stable at 45-50%, WMA in the septum similar to prior. At November 2017 visit she reported chest tightness. Stress and echocardiogram showed anteroseptal infarct with valarie-infarct ischemia and LVEF 45%. Addition of Imdur was recommended. Due to persistent severe headaches, Imdur was changed to Norvasc. Norvasc was later stopped due increase in GERD symptoms.      Today she comes to the office for a follow up visit.  Apparently in March she developed significant shortness of breath and chest congestion for which she was diagnosed and treated for lung infection.  She is taking respiratory inhaler with benefit.  Unfortunately she continues to smoke.  According to patient she also has lung nodules but since size remains stable insurance would not cover PET scan.  From a cardiac standpoint she denies chest pain or palpitations.  Fatigue and shortness of breath has improved since treatment of respiratory illness.  HER two  main complaints today are side effects from generic Zetia and request brand name.  According to patient her pharmacist told her that PCP office was trying to get PA for brand name.  She continues to have  "significant stiffness and weakness in her knees and lower legs.  Sometimes she has swelling.  According to patient she has had her \"circulation checked\" and was found to be normal.  She feels the amlodipine is causing the symptoms and requested medication change.  In the past she was on losartan but due to manufacturing recall she does not want to be placed on this or similar medication.          HPI     Cardiac History:    Past Surgical History:   Procedure Laterality Date   • CARDIAC CATHETERIZATION  1999    stenting   • CARDIAC CATHETERIZATION  05/13/2005    60% LAD and D1   • CARDIAC CATHETERIZATION  06/24/2014    Dr Covington- 100% LAD  3.2x12 Promus   • CARDIOVASCULAR STRESS TEST  03/20/2008    6 min, 97% THR. Negative.   • CARDIOVASCULAR STRESS TEST  08/01/2012    3 min, 87%THR. R/O Septal Ischemia   • CARDIOVASCULAR STRESS TEST  11/16/2017    3 Min, 59 Secs. 86% THR. EF 37%. Anteroseptal Infarct with periinfarct Ischemia.   • ECHO - CONVERTED  03/20/2008    EF >60%   • ECHO - CONVERTED  08/01/2012    EF 60% Septal WMA   • ECHO - CONVERTED  06/24/2014    ([facility]) EF 40%. Septal WMA. RVSP- 40mmHg   • ECHO - CONVERTED  03/15/2016    EF 45-50%, apical WMA, similar to prior   • ECHO - CONVERTED  11/16/2017    EF 45%. Anteroseptal WMA       Current Outpatient Medications   Medication Sig Dispense Refill   • amLODIPine (NORVASC) 5 MG tablet Take 5 mg by mouth Daily.     • aspirin 81 MG EC tablet Take 81 mg by mouth daily.     • carvedilol (COREG) 3.125 MG tablet Take 1 tablet by mouth 2 (Two) Times a Day. 60 tablet 8   • cholecalciferol (VITAMIN D3) 1000 UNITS tablet Take 1,000 Units by mouth daily.     • clopidogrel (PLAVIX) 75 MG tablet Take 1 tablet by mouth Daily. 30 tablet 8   • ipratropium (Atrovent HFA) 17 MCG/ACT inhaler Inhale 2 puffs 4 (Four) Times a Day.     • loratadine (CLARITIN) 10 MG tablet Take 10 mg by mouth Daily As Needed for Allergies.     • nitroglycerin (NITROSTAT) 0.4 MG SL tablet 1 under the " tongue as needed for angina, may repeat q5mins for up three doses 25 tablet 1   • pantoprazole (PROTONIX) 40 MG EC tablet Take 40 mg by mouth Daily.     • chlorthalidone (HYGROTON) 25 MG tablet Take 1 tablet by mouth Daily. 30 tablet 11   • ezetimibe (ZETIA) 10 MG tablet TAKE ONE TABLET BY MOUTH DAILY 30 tablet 1     No current facility-administered medications for this visit.        Amoxicillin; Crestor [rosuvastatin calcium]; Dye fdc red [red dye]; Lipitor [atorvastatin]; Penicillins; Sulfa antibiotics; Tetracyclines & related; Zantac [ranitidine hcl]; and Zocor [simvastatin]    Past Medical History:   Diagnosis Date   • COPD (chronic obstructive pulmonary disease) (CMS/[facility])    • Diabetes mellitus (CMS/[facility])    • H/O colonoscopy with polypectomy    • History of hysterectomy    • Hx of appendectomy    • Hx of cholecystectomy    • Hyperlipidemia    • Hypertension    • IHD (ischemic heart disease)    • Seasonal allergies        Social History     Socioeconomic History   • Marital status:      Spouse name: Not on file   • Number of children: Not on file   • Years of education: Not on file   • Highest education level: Not on file   Tobacco Use   • Smoking status: Current Every Day Smoker     Packs/day: 1.00     Types: Cigarettes   • Smokeless tobacco: Never Used   Substance and Sexual Activity   • Alcohol use: No   • Drug use: No       Family History   Problem Relation Age of Onset   • Asthma Mother    • Stroke Father    • Hypertension Father    • Hyperlipidemia Father    • Heart attack Other        Review of Systems   Constitution: Positive for malaise/fatigue (some better since spring). Negative for decreased appetite.   HENT: Negative for congestion, hoarse voice, nosebleeds and sore throat.    Eyes: Negative for blurred vision.   Cardiovascular: Positive for leg swelling (mild). Negative for chest pain and near-syncope.   Respiratory: Negative for shortness of breath and snoring.    Endocrine: Negative for  "cold intolerance, heat intolerance, polydipsia, polyphagia and polyuria.   Hematologic/Lymphatic: Negative for adenopathy and bleeding problem. Does not bruise/bleed easily.   Skin: Negative for color change, dry skin and itching.   Musculoskeletal: Positive for joint pain and muscle cramps. Negative for falls and myalgias.   Gastrointestinal: Negative for abdominal pain, change in bowel habit, dysphagia, heartburn, melena and nausea.   Genitourinary: Negative for dysuria and hematuria.   Neurological: Negative for dizziness and light-headedness.   Psychiatric/Behavioral: Negative for altered mental status. The patient does not have insomnia.    Allergic/Immunologic: Negative for environmental allergies and hives.        Objective     /80 (BP Location: Left arm)   Pulse 76   Temp 97.9 °F (36.6 °C)   Ht 160 cm (62.99\")   Wt 72.5 kg (159 lb 12.8 oz)   BMI 28.32 kg/m²     Physical Exam   Constitutional: She is oriented to person, place, and time. She appears well-nourished.   HENT:   Head: Normocephalic.   Eyes: Pupils are equal, round, and reactive to light. Conjunctivae are normal.   Neck: Normal range of motion. Neck supple. Carotid bruit is not present.   Cardiovascular: Normal rate, regular rhythm, S1 normal and S2 normal.   No murmur heard.  Pulmonary/Chest: Effort normal and breath sounds normal.   Abdominal: Soft. Bowel sounds are normal.   Musculoskeletal: Normal range of motion.   Neurological: She is alert and oriented to person, place, and time.   Skin: Skin is warm and dry. No pallor.   Psychiatric: She has a normal mood and affect. Her behavior is normal.        Procedures: none today         Assessment/Plan      Nelida was seen today for follow-up, shortness of breath and med refill.    Diagnoses and all orders for this visit:    Cigarette smoker    IHD (ischemic heart disease)    Old MI (myocardial infarction)    Left ventricular dysfunction    Essential hypertension  -     chlorthalidone " (HYGROTON) 25 MG tablet; Take 1 tablet by mouth Daily.    Mixed hyperlipidemia    IHD/LV function- patient presents today without cardiac concerns or complaints.  Given she remains asymptomatic will defer repeat cardiac work-up until next visit, but patient understands to call sooner should any problems or symptoms develop.    Hypertension- patient feels amlodipine causes lower leg discomfort and mild swelling. She request medication change. Due to red dye did not order HCTZ. Prescription for chlorthalidone faxed to her pharmacy.  She understands to maintain adequate water intake to avoid dehydration as well as occasional potassium rich foods.  She will monitor her blood pressure at home and call for any issues.    Patient's Body mass index is 28.32 kg/m². BMI is above normal parameters. Recommendations include: nutrition counseling.  DASH diet information provided.    For lipid management she has been taking Zetia.  Apparently she has developed side effects to the generic and is currently trying to get PA for insurance to cover brand name.  She will follow with you for lab orders/management.     Nelida Ford  reports that she has been smoking cigarettes. She has been smoking about 1.00 pack per day. She has never used smokeless tobacco.. I have educated her on the risk of diseases from using tobacco products such as cancer, COPD and heart diease.   I advised her to quit and she is not willing to quit. I spent 3  minutes counseling the patient.    A 6-month follow-up visit scheduled.  Please call sooner for any cardiac concerns.           Electronically signed by ROSALINDA Pires,  July 6, 2020 10:57

## 2020-11-25 RX ORDER — CARVEDILOL 3.12 MG/1
TABLET ORAL
Qty: 60 TABLET | Refills: 7 | Status: SHIPPED | OUTPATIENT
Start: 2020-11-25 | End: 2021-08-12 | Stop reason: SDUPTHER

## 2021-01-05 ENCOUNTER — OFFICE VISIT (OUTPATIENT)
Dept: CARDIOLOGY | Facility: CLINIC | Age: 71
End: 2021-01-05

## 2021-01-05 VITALS
WEIGHT: 162 LBS | BODY MASS INDEX: 28.7 KG/M2 | HEART RATE: 84 BPM | HEIGHT: 63 IN | SYSTOLIC BLOOD PRESSURE: 120 MMHG | TEMPERATURE: 97.5 F | DIASTOLIC BLOOD PRESSURE: 70 MMHG

## 2021-01-05 DIAGNOSIS — E11.9 TYPE 2 DIABETES MELLITUS WITHOUT COMPLICATION, WITHOUT LONG-TERM CURRENT USE OF INSULIN (HCC): ICD-10-CM

## 2021-01-05 DIAGNOSIS — E66.3 OVERWEIGHT: ICD-10-CM

## 2021-01-05 DIAGNOSIS — I25.9 IHD (ISCHEMIC HEART DISEASE): Primary | ICD-10-CM

## 2021-01-05 DIAGNOSIS — E78.2 MIXED HYPERLIPIDEMIA: ICD-10-CM

## 2021-01-05 DIAGNOSIS — R06.02 SHORTNESS OF BREATH: ICD-10-CM

## 2021-01-05 DIAGNOSIS — I10 ESSENTIAL HYPERTENSION: ICD-10-CM

## 2021-01-05 DIAGNOSIS — J44.9 MIXED TYPE COPD (CHRONIC OBSTRUCTIVE PULMONARY DISEASE) (HCC): ICD-10-CM

## 2021-01-05 DIAGNOSIS — Z72.0 TOBACCO ABUSE: ICD-10-CM

## 2021-01-05 DIAGNOSIS — Z95.5 HISTORY OF PLACEMENT OF STENT IN LAD CORONARY ARTERY: ICD-10-CM

## 2021-01-05 PROCEDURE — 99214 OFFICE O/P EST MOD 30 MIN: CPT | Performed by: NURSE PRACTITIONER

## 2021-01-05 RX ORDER — CLOPIDOGREL BISULFATE 75 MG/1
75 TABLET ORAL DAILY
Qty: 30 TABLET | Refills: 8 | Status: SHIPPED | OUTPATIENT
Start: 2021-01-05 | End: 2021-08-12 | Stop reason: SDUPTHER

## 2021-01-05 NOTE — PATIENT INSTRUCTIONS
Smoking Tobacco Information, Adult  Smoking tobacco can be harmful to your health. Tobacco contains a poisonous (toxic), colorless chemical called nicotine. Nicotine is addictive. It changes the brain and can make it hard to stop smoking. Tobacco also has other toxic chemicals that can hurt your body and raise your risk of many cancers.  How can smoking tobacco affect me?  Smoking tobacco puts you at risk for:  · Cancer. Smoking is most commonly associated with lung cancer, but can also lead to cancer in other parts of the body.  · Chronic obstructive pulmonary disease (COPD). This is a long-term lung condition that makes it hard to breathe. It also gets worse over time.  · High blood pressure (hypertension), heart disease, stroke, or heart attack.  · Lung infections, such as pneumonia.  · Cataracts. This is when the lenses in the eyes become clouded.  · Digestive problems. This may include peptic ulcers, heartburn, and gastroesophageal reflux disease (GERD).  · Oral health problems, such as gum disease and tooth loss.  · Loss of taste and smell.  Smoking can affect your appearance by causing:  · Wrinkles.  · Yellow or stained teeth, fingers, and fingernails.  Smoking tobacco can also affect your social life, because:  · It may be challenging to find places to smoke when away from home. Many workplaces, restaurants, hotels, and public places are tobacco-free.  · Smoking is expensive. This is due to the cost of tobacco and the long-term costs of treating health problems from smoking.  · Secondhand smoke may affect those around you. Secondhand smoke can cause lung cancer, breathing problems, and heart disease. Children of smokers have a higher risk for:  ? Sudden infant death syndrome (SIDS).  ? Ear infections.  ? Lung infections.  If you currently smoke tobacco, quitting now can help you:  · Lead a longer and healthier life.  · Look, smell, breathe, and feel better over time.  · Save money.  · Protect others from the  harms of secondhand smoke.  What actions can I take to prevent health problems?  Quit smoking    · Do not start smoking. Quit if you already do.  · Make a plan to quit smoking and commit to it. Look for programs to help you and ask your health care provider for recommendations and ideas.  · Set a date and write down all the reasons you want to quit.  · Let your friends and family know you are quitting so they can help and support you. Consider finding friends who also want to quit. It can be easier to quit with someone else, so that you can support each other.  · Talk with your health care provider about using nicotine replacement medicines to help you quit, such as gum, lozenges, patches, sprays, or pills.  · Do not replace cigarette smoking with electronic cigarettes, which are commonly called e-cigarettes. The safety of e-cigarettes is not known, and some may contain harmful chemicals.  · If you try to quit but return to smoking, stay positive. It is common to slip up when you first quit, so take it one day at a time.  · Be prepared for cravings. When you feel the urge to smoke, chew gum or suck on hard candy.  Lifestyle  · Stay busy and take care of your body.  · Drink enough fluid to keep your urine pale yellow.  · Get plenty of exercise and eat a healthy diet. This can help prevent weight gain after quitting.  · Monitor your eating habits. Quitting smoking can cause you to have a larger appetite than when you smoke.  · Find ways to relax. Go out with friends or family to a movie or a restaurant where people do not smoke.  · Ask your health care provider about having regular tests (screenings) to check for cancer. This may include blood tests, imaging tests, and other tests.  · Find ways to manage your stress, such as meditation, yoga, or exercise.  Where to find support  To get support to quit smoking, consider:  · Asking your health care provider for more information and resources.  · Taking classes to learn  more about quitting smoking.  · Looking for local organizations that offer resources about quitting smoking.  · Joining a support group for people who want to quit smoking in your local community.  · Calling the smokefree.gov counselor helpline: 1-800-Quit-Now (1-771.284.8684)  Where to find more information  You may find more information about quitting smoking from:  · HelpGuide.org: www.helpguide.org  · Smokefree.gov: smokefree.gov  · American Lung Association: www.lung.org  Contact a health care provider if you:  · Have problems breathing.  · Notice that your lips, nose, or fingers turn blue.  · Have chest pain.  · Are coughing up blood.  · Feel faint or you pass out.  · Have other health changes that cause you to worry.  Summary  · Smoking tobacco can negatively affect your health, the health of those around you, your finances, and your social life.  · Do not start smoking. Quit if you already do. If you need help quitting, ask your health care provider.  · Think about joining a support group for people who want to quit smoking in your local community. There are many effective programs that will help you to quit this behavior.  This information is not intended to replace advice given to you by your health care provider. Make sure you discuss any questions you have with your health care provider.  Document Revised: 09/11/2020 Document Reviewed: 01/02/2018  [employer]vier Patient Education © 2020 [employer]

## 2021-01-05 NOTE — PROGRESS NOTES
"Chief Complaint   Patient presents with   • Follow-up     Cardiac management.   • Lab     Last labs 3 weeks ago per PCP.   • Shortness of Breath     She relates to COPD.   • Zetia     She has not took for 3-4 months. She reports cholesterol is elevated per PCP. She states \"the ezetimibe causes severe constipation and unable to tolerate laxatives\".   • Med Refill     Needs refills on Plavix-90 day.       Cardiac Complaints  dyspnea      Subjective   Nelida Ford is a 70 y.o. female with HTN, hyperlipidemia, COPD, and IHD diagnosed in 1999 when she had stenting of the LAD. She presented in June of 2014 with acute MI found to have 100% occluded LAD and underwent stenting of the same. Post interventional course was complicated with respiratory distress that slowly improved. At the time of discharge, EF improved to 40%. Echo in March 2016 showed EF stable at 45-50%, WMA in the septum similar to prior. At November 2017 visit she reported chest tightness. Stress and echocardiogram showed anteroseptal infarct with valarie-infarct ischemia and LVEF 45%. Addition of Imdur was recommended. Due to persistent severe headaches, Imdur was changed to Norvasc. Norvasc was later stopped due increase in GERD symptoms.  At last visit, she reported lung nodule that had remained stable per PET scan.     Patient returns today for follow up and denies any new concerns. She does continue to have SOA but relates to her COPD and admits this is unchanged. She does admit to treatment for pneumonia x2. She does state some issues with taking her zetia and admits it caused a great deal of constipation, she has not been taking for 3-4 months. Labs remain followed by PCP and lipids increased 3 weeks ago, no current available. Refills of plavix requested for 90 day supply. Patient does admit to cataract surgery times 2 since last visit and has recovered well.  Unfortunately, she still smokes despite concerns.       Cardiac History  Past Surgical " History:   Procedure Laterality Date   • CARDIAC CATHETERIZATION  1999    stenting   • CARDIAC CATHETERIZATION  05/13/2005    60% LAD and D1   • CARDIAC CATHETERIZATION  06/24/2014    Dr Covington- 100% LAD  3.2x12 Promus   • CARDIOVASCULAR STRESS TEST  03/20/2008    6 min, 97% THR. Negative.   • CARDIOVASCULAR STRESS TEST  08/01/2012    3 min, 87%THR. R/O Septal Ischemia   • CARDIOVASCULAR STRESS TEST  11/16/2017    3 Min, 59 Secs. 86% THR. EF 37%. Anteroseptal Infarct with periinfarct Ischemia.   • ECHO - CONVERTED  03/20/2008    EF >60%   • ECHO - CONVERTED  08/01/2012    EF 60% Septal WMA   • ECHO - CONVERTED  06/24/2014    ([facility]) EF 40%. Septal WMA. RVSP- 40mmHg   • ECHO - CONVERTED  03/15/2016    EF 45-50%, apical WMA, similar to prior   • ECHO - CONVERTED  11/16/2017    EF 45%. Anteroseptal WMA       Current Outpatient Medications   Medication Sig Dispense Refill   • aspirin 81 MG EC tablet Take 81 mg by mouth daily.     • carvedilol (COREG) 3.125 MG tablet TAKE ONE TABLET BY MOUTH TWICE A DAY 60 tablet 7   • chlorthalidone (HYGROTON) 25 MG tablet Take 1 tablet by mouth Daily. 30 tablet 11   • clopidogrel (PLAVIX) 75 MG tablet Take 1 tablet by mouth Daily. 30 tablet 8   • Garlic 1000 MG capsule Take  by mouth Daily.     • ipratropium (Atrovent HFA) 17 MCG/ACT inhaler Inhale 2 puffs 2 (Two) Times a Day As Needed.     • loratadine (CLARITIN) 10 MG tablet Take 10 mg by mouth Daily As Needed for Allergies.     • metFORMIN (GLUCOPHAGE) 500 MG tablet Take 500 mg by mouth Daily With Breakfast.     • nitroglycerin (NITROSTAT) 0.4 MG SL tablet 1 under the tongue as needed for angina, may repeat q5mins for up three doses 25 tablet 1   • pantoprazole (PROTONIX) 40 MG EC tablet Take 40 mg by mouth Daily.       No current facility-administered medications for this visit.        Amoxicillin, Crestor [rosuvastatin calcium], Dye fdc red [red dye], Lipitor [atorvastatin], Other, Penicillins, Sulfa antibiotics, Tetracyclines &  "related, Zantac [ranitidine hcl], and Zocor [simvastatin]    Past Medical History:   Diagnosis Date   • COPD (chronic obstructive pulmonary disease) (CMS/HCC)    • Diabetes mellitus (CMS/HCC)    • H/O bilateral cataract extraction    • H/O colonoscopy with polypectomy    • History of hysterectomy    • Hx of appendectomy    • Hx of cholecystectomy    • Hyperlipidemia    • Hypertension    • IHD (ischemic heart disease)    • Seasonal allergies        Social History     Socioeconomic History   • Marital status:      Spouse name: Not on file   • Number of children: Not on file   • Years of education: Not on file   • Highest education level: Not on file   Tobacco Use   • Smoking status: Current Every Day Smoker     Packs/day: 1.00     Types: Cigarettes   • Smokeless tobacco: Never Used   Substance and Sexual Activity   • Alcohol use: No   • Drug use: No       Family History   Problem Relation Age of Onset   • Asthma Mother    • Stroke Father    • Hypertension Father    • Hyperlipidemia Father    • Heart attack Other        Review of Systems   Constitution: Negative for malaise/fatigue and night sweats.   Cardiovascular: Positive for dyspnea on exertion. Negative for chest pain, claudication, irregular heartbeat, leg swelling, near-syncope, orthopnea, palpitations and syncope.   Respiratory: Positive for shortness of breath. Negative for cough and wheezing.    Musculoskeletal: Positive for muscle cramps and stiffness. Negative for back pain and joint pain.   Gastrointestinal: Negative for anorexia, heartburn, melena, nausea and vomiting.   Genitourinary: Negative for dysuria, hematuria and hesitancy.   Neurological: Negative for dizziness, light-headedness and loss of balance.   Psychiatric/Behavioral: Negative for depression and memory loss. The patient is not nervous/anxious.            Objective     /70 (BP Location: Right arm)   Pulse 84   Temp 97.5 °F (36.4 °C)   Ht 160 cm (62.99\")   Wt 73.5 kg (162 " lb)   BMI 28.70 kg/m²     Constitutional:       Appearance: Healthy appearance. Not in distress.   Eyes:      Pupils: Pupils are equal, round, and reactive to light.   HENT:      Nose: Nose normal.   Neck:      Musculoskeletal: Normal range of motion and neck supple.   Pulmonary:      Effort: Pulmonary effort is normal.      Breath sounds: Normal breath sounds.   Cardiovascular:      PMI at left midclavicular line. Normal rate. Regular rhythm.      Murmurs: There is a systolic murmur.   Abdominal:      Palpations: Abdomen is soft.   Musculoskeletal: Normal range of motion.   Skin:     General: Skin is warm and dry.   Neurological:      Mental Status: Oriented to person, place and time.         Procedures    Assessment/Plan     IHD/LV dysfunction:  Patient continues on DAPT therapy and tolerates well. Bleeding and bruising denied. Current advised. Cardiac status stable, no repeat workup advised. We will consider after next follow up, as EF has remained low but improved from prior.     HTN:  Well managed on current. No adjustment to current coreg, chlorthalidone, or norvasc therapy recommended. She will continue same.     DM: Taking metformin therapy. Followed by your office. AIC done with you as well. Patient instructed on good cardiac ADA diet with limited carb/sugar advised.     Hyperlipidemia:  Unable to tolerate statin therapy or zetia. Patient was instructed to discuss PSCK9 therapy with your office as she states you recently checked FLP. Can we have for review?     Tobacco abuse:  Patient unfortunately smoking at least a ppd. Information/handout provided with information on cessation. Discussed for over 3 minutes/benefits.    BMI noted at 28.70, good cardiac diet with limited carbs, calories, and activity as tolerated advised.      Refills sent per request.     6 month follow up scheduled with patient urged to call with concerns so further workup/sooner appointment may be made.        Problems Addressed this  Visit        Other    HTN (hypertension)    Hyperlipidemia    Controlled type 2 diabetes mellitus without complication, without long-term current use of insulin (CMS/[facility])    Relevant Medications    metFORMIN (GLUCOPHAGE) 500 MG tablet    History of placement of stent in LAD coronary artery    IHD (ischemic heart disease) - Primary    Relevant Medications    clopidogrel (PLAVIX) 75 MG tablet      Other Visit Diagnoses     Shortness of breath        Mixed type COPD (chronic obstructive pulmonary disease) (CMS/[facility])        Tobacco abuse        Overweight          Diagnoses       Codes Comments    IHD (ischemic heart disease)    -  Primary ICD-10-CM: I25.9  ICD-9-CM: 414.9     Essential hypertension     ICD-10-CM: I10  ICD-9-CM: 401.9     Mixed hyperlipidemia     ICD-10-CM: E78.2  ICD-9-CM: 272.2     Shortness of breath     ICD-10-CM: R06.02  ICD-9-CM: 786.05     History of placement of stent in LAD coronary artery     ICD-10-CM: Z95.5  ICD-9-CM: V45.82     Mixed type COPD (chronic obstructive pulmonary disease) (CMS/[facility])     ICD-10-CM: J44.9  ICD-9-CM: 496     Type 2 diabetes mellitus without complication, without long-term current use of insulin (CMS/[facility])     ICD-10-CM: E11.9  ICD-9-CM: 250.00     Tobacco abuse     ICD-10-CM: Z72.0  ICD-9-CM: 305.1     Overweight     ICD-10-CM: E66.3  ICD-9-CM: 278.02           Patient's Body mass index is 28.7 kg/m². BMI is above normal parameters. Recommendations include: nutrition counseling.      Nelida Ford  reports that she has been smoking cigarettes. She has been smoking about 1.00 pack per day. She has never used smokeless tobacco. Information provided in regards on cessation, she declines medication. Patient was receptive to information, hand out provided. More than 3 minutes spent discussing.           Electronically signed by Shelli Retana, ROSALINDA January 5, 2021 11:58 EST

## 2021-06-15 ENCOUNTER — TELEPHONE (OUTPATIENT)
Dept: CARDIOLOGY | Facility: CLINIC | Age: 71
End: 2021-06-15

## 2021-07-26 DIAGNOSIS — I10 ESSENTIAL HYPERTENSION: ICD-10-CM

## 2021-08-02 RX ORDER — CHLORTHALIDONE 25 MG/1
TABLET ORAL
Qty: 30 TABLET | Refills: 10 | Status: SHIPPED | OUTPATIENT
Start: 2021-08-02 | End: 2021-08-12 | Stop reason: SDUPTHER

## 2021-08-12 ENCOUNTER — OFFICE VISIT (OUTPATIENT)
Dept: CARDIOLOGY | Facility: CLINIC | Age: 71
End: 2021-08-12

## 2021-08-12 VITALS
SYSTOLIC BLOOD PRESSURE: 120 MMHG | HEIGHT: 63 IN | BODY MASS INDEX: 28.03 KG/M2 | HEART RATE: 84 BPM | WEIGHT: 158.2 LBS | DIASTOLIC BLOOD PRESSURE: 82 MMHG

## 2021-08-12 DIAGNOSIS — R06.02 SHORTNESS OF BREATH: ICD-10-CM

## 2021-08-12 DIAGNOSIS — I10 ESSENTIAL HYPERTENSION: Primary | ICD-10-CM

## 2021-08-12 DIAGNOSIS — Z95.5 HISTORY OF PLACEMENT OF STENT IN LAD CORONARY ARTERY: ICD-10-CM

## 2021-08-12 DIAGNOSIS — I25.9 IHD (ISCHEMIC HEART DISEASE): ICD-10-CM

## 2021-08-12 DIAGNOSIS — E11.9 TYPE 2 DIABETES MELLITUS WITHOUT COMPLICATION, WITHOUT LONG-TERM CURRENT USE OF INSULIN (HCC): ICD-10-CM

## 2021-08-12 DIAGNOSIS — E78.2 MIXED HYPERLIPIDEMIA: ICD-10-CM

## 2021-08-12 PROCEDURE — 99214 OFFICE O/P EST MOD 30 MIN: CPT | Performed by: NURSE PRACTITIONER

## 2021-08-12 RX ORDER — PREDNISOLONE ACETATE 10 MG/ML
1 SUSPENSION/ DROPS OPHTHALMIC 3 TIMES DAILY
COMMUNITY

## 2021-08-12 RX ORDER — ASPIRIN 81 MG/1
81 TABLET ORAL DAILY
Qty: 90 TABLET | Refills: 3 | Status: SHIPPED | OUTPATIENT
Start: 2021-08-12

## 2021-08-12 RX ORDER — TIOTROPIUM BROMIDE INHALATION SPRAY 3.12 UG/1
2 SPRAY, METERED RESPIRATORY (INHALATION)
COMMUNITY

## 2021-08-12 RX ORDER — CARVEDILOL 3.12 MG/1
3.12 TABLET ORAL 2 TIMES DAILY
Qty: 180 TABLET | Refills: 3 | Status: SHIPPED | OUTPATIENT
Start: 2021-08-12 | End: 2021-08-26 | Stop reason: SDUPTHER

## 2021-08-12 RX ORDER — CHLORTHALIDONE 25 MG/1
25 TABLET ORAL DAILY
Qty: 90 TABLET | Refills: 3 | Status: SHIPPED | OUTPATIENT
Start: 2021-08-12

## 2021-08-12 RX ORDER — CLOPIDOGREL BISULFATE 75 MG/1
75 TABLET ORAL DAILY
Qty: 90 TABLET | Refills: 3 | Status: SHIPPED | OUTPATIENT
Start: 2021-08-12

## 2021-08-12 RX ORDER — EZETIMIBE 10 MG/1
10 TABLET ORAL DAILY
Qty: 90 TABLET | Refills: 3 | Status: SHIPPED | OUTPATIENT
Start: 2021-08-12

## 2021-08-12 RX ORDER — EZETIMIBE 10 MG/1
10 TABLET ORAL DAILY
COMMUNITY
End: 2021-08-12 | Stop reason: SDUPTHER

## 2021-08-12 NOTE — PROGRESS NOTES
Chief Complaint   Patient presents with   • Follow-up     Cardiac management   • Lab     Has copy of most recent labs.   • Headache     Had severe headache for days then noticed redness in left eye with memory problems noted. PCP has ordered carotid US and MRI of head.   • Shortness of Breath     Relates to emphysema. She is following with Dr Barker.   • Med Refill     Needs refills on cardiac medications-90 day.     Estelle Ford is a 71 y.o. female with HTN, hyperlipidemia, statin intolerant, COPD and IHD diagnosed in 1999 when she had stenting of the LAD. In June 2014, she presented with acute MI found to have 100% occluded LAD with repeat stenting. Post interventional course was complicated with respiratory distress that slowly improved. At the time of discharge, EF improved to 40%. Echo in March 2016 showed EF stable at 45-50%, WMA in the septum similar to prior. At November 2017 visit she reported chest tightness. Stress and echocardiogram showed anteroseptal infarct with valarie-infarct ischemia and LVEF 45%. Addition of Imdur was recommended. Due to persistent severe headaches, Imdur was changed to Norvasc. Norvasc was later stopped due increase in GERD symptoms.  She was noted to have lung nodule reported as stable by PET. Lipids managed with Zetia.     She returns today for follow up. She denies anginal symptoms but shortness of breath persists. She recently started walking with a friend, approximately one mile/day. Labs brought today showed H/H 16/47.5, TSH 2.48, A1c 6.7%, glucose 134, BUN/CR 15/0.75, GFR 79, normal LFT, , , HDL 39, . She has declined PCSK9.        Cardiac History:    Past Surgical History:   Procedure Laterality Date   • CARDIAC CATHETERIZATION  1999    stenting   • CARDIAC CATHETERIZATION  05/13/2005    60% LAD and D1   • CARDIAC CATHETERIZATION  06/24/2014    Dr Covington- 100% LAD  3.2x12 Promus   • CARDIOVASCULAR STRESS TEST  03/20/2008    6 min, 97%  THR. Negative.   • CARDIOVASCULAR STRESS TEST  08/01/2012    3 min, 87%THR. R/O Septal Ischemia   • CARDIOVASCULAR STRESS TEST  11/16/2017    3 Min, 59 Secs. 86% THR. EF 37%. Anteroseptal Infarct with periinfarct Ischemia.   • ECHO - CONVERTED  03/20/2008    EF >60%   • ECHO - CONVERTED  08/01/2012    EF 60% Septal WMA   • ECHO - CONVERTED  06/24/2014    ([facility]) EF 40%. Septal WMA. RVSP- 40mmHg   • ECHO - CONVERTED  03/15/2016    EF 45-50%, apical WMA, similar to prior   • ECHO - CONVERTED  11/16/2017    EF 45%. Anteroseptal WMA       Current Outpatient Medications   Medication Sig Dispense Refill   • aspirin (aspirin) 81 MG EC tablet Take 1 tablet by mouth Daily. 90 tablet 3   • carvedilol (COREG) 3.125 MG tablet Take 1 tablet by mouth 2 (Two) Times a Day. 180 tablet 3   • chlorthalidone (HYGROTON) 25 MG tablet Take 1 tablet by mouth Daily. 90 tablet 3   • clopidogrel (PLAVIX) 75 MG tablet Take 1 tablet by mouth Daily. 90 tablet 3   • ezetimibe (ZETIA) 10 MG tablet Take 1 tablet by mouth Daily. 90 tablet 3   • loratadine (CLARITIN) 10 MG tablet Take 10 mg by mouth Daily As Needed for Allergies.     • nitroglycerin (NITROSTAT) 0.4 MG SL tablet 1 under the tongue as needed for angina, may repeat q5mins for up three doses 25 tablet 1   • pantoprazole (PROTONIX) 40 MG EC tablet Take 40 mg by mouth Daily As Needed.     • prednisoLONE acetate (PRED FORTE) 1 % ophthalmic suspension 1 drop 3 (Three) Times a Day.     • tiotropium bromide monohydrate (Spiriva Respimat) 2.5 MCG/ACT aerosol solution inhaler Inhale 2 puffs Daily.       No current facility-administered medications for this visit.     Amlodipine, Amoxicillin, Crestor [rosuvastatin calcium], Dye fdc red [red dye], Imdur [isosorbide nitrate], Lipitor [atorvastatin], Other, Penicillins, Sulfa antibiotics, Tetracyclines & related, Zantac [ranitidine hcl], and Zocor [simvastatin]    Past Medical History:   Diagnosis Date   • COPD (chronic obstructive pulmonary disease)  "(CMS/[facility])    • Diabetes mellitus (CMS/[facility])    • Emphysema of lung (CMS/[facility])    • H/O bilateral cataract extraction    • H/O colonoscopy with polypectomy    • History of hysterectomy    • Hx of appendectomy    • Hx of cholecystectomy    • Hyperlipidemia    • Hypertension    • IHD (ischemic heart disease)    • Seasonal allergies      Social History     Socioeconomic History   • Marital status:      Spouse name: Not on file   • Number of children: Not on file   • Years of education: Not on file   • Highest education level: Not on file   Tobacco Use   • Smoking status: Current Every Day Smoker     Packs/day: 0.50     Years: 57.00     Pack years: 28.50     Types: Cigarettes   • Smokeless tobacco: Never Used   Vaping Use   • Vaping Use: Never used   Substance and Sexual Activity   • Alcohol use: No   • Drug use: No     Family History   Problem Relation Age of Onset   • Asthma Mother    • Stroke Father    • Hypertension Father    • Hyperlipidemia Father    • Heart attack Other      Review of Systems   Constitutional: Positive for weight loss (down 4 lb). Negative for decreased appetite and malaise/fatigue.   Eyes: Negative for blurred vision.   Cardiovascular: Positive for dyspnea on exertion. Negative for chest pain, leg swelling, palpitations and syncope.   Respiratory: Positive for shortness of breath. Negative for sleep disturbances due to breathing.    Endocrine: Negative.    Hematologic/Lymphatic: Negative for bleeding problem. Does not bruise/bleed easily.   Skin: Negative.    Musculoskeletal: Negative for falls and myalgias.   Gastrointestinal: Negative for abdominal pain, heartburn and melena.   Genitourinary: Negative for hematuria.   Neurological: Positive for headaches. Negative for dizziness and light-headedness.   Psychiatric/Behavioral: Negative for altered mental status.   Allergic/Immunologic: Negative.       Objective     /82 (BP Location: Left arm)   Pulse 84   Ht 160 cm (62.99\")   Wt " 71.8 kg (158 lb 3.2 oz)   BMI 28.03 kg/m²     Vitals and nursing note reviewed.   Constitutional:       General: Not in acute distress.     Appearance: Well-developed. Not diaphoretic.   Eyes:      Pupils: Pupils are equal, round, and reactive to light.   HENT:      Head: Normocephalic.   Pulmonary:      Effort: Pulmonary effort is normal. No respiratory distress.      Breath sounds: Wheezing present.   Cardiovascular:      Normal rate. Regular rhythm.   Pulses:     Intact distal pulses.   Abdominal:      General: Bowel sounds are normal.      Palpations: Abdomen is soft.   Musculoskeletal: Normal range of motion.      Cervical back: Normal range of motion. Skin:     General: Skin is warm and dry.   Neurological:      Mental Status: Alert and oriented to person, place, and time.        Procedures          Problem List Items Addressed This Visit        Cardiac and Vasculature    HTN (hypertension) - Primary    Relevant Medications    carvedilol (COREG) 3.125 MG tablet    chlorthalidone (HYGROTON) 25 MG tablet    Other Relevant Orders    Stress Test With Myocardial Perfusion One Day    Adult Transthoracic Echo Complete W/ Cont if Necessary Per Protocol    Hyperlipidemia    Relevant Medications    ezetimibe (ZETIA) 10 MG tablet    Other Relevant Orders    Stress Test With Myocardial Perfusion One Day    Adult Transthoracic Echo Complete W/ Cont if Necessary Per Protocol    History of placement of stent in LAD coronary artery    Relevant Orders    Stress Test With Myocardial Perfusion One Day    Adult Transthoracic Echo Complete W/ Cont if Necessary Per Protocol    IHD (ischemic heart disease)    Relevant Medications    carvedilol (COREG) 3.125 MG tablet    clopidogrel (PLAVIX) 75 MG tablet    Other Relevant Orders    Stress Test With Myocardial Perfusion One Day    Adult Transthoracic Echo Complete W/ Cont if Necessary Per Protocol      Other Visit Diagnoses     Shortness of breath        Relevant Orders    Stress  Test With Myocardial Perfusion One Day    Adult Transthoracic Echo Complete W/ Cont if Necessary Per Protocol    Type 2 diabetes mellitus without complication, without long-term current use of insulin (CMS/[facility])        Relevant Orders    Stress Test With Myocardial Perfusion One Day    Adult Transthoracic Echo Complete W/ Cont if Necessary Per Protocol         1. CAD/LV dysfunction- s/p stenting of the LAD twice. Her last cardiac work up in 2016 showed valarie-infarct ischemia managed medically. Due to length of time and dyspnea on exertion, recommend repeating stress test and echocardiogram to evaluate ischemic burden, LVEF, mitral regurgitation. Continue DAPT.    2. HTN- well controlled with carvedilol and chlorthalidone. Limit na.     3. Hyperlipidemia- managed with Zetia, unfortunately does not tolerate statin and declines PCSK9. Mediterranean diet sheet given.     4. COPD- not interested in smoking cessation.     Patient's Body mass index is 28.03 kg/m². indicating that she is overweight (BMI 25-29.9). Encouraged continued walking for exercise, heart healthy diet.     Nelida Ford  reports that she has been smoking cigarettes. She has a 28.50 pack-year smoking history. She has never used smokeless tobacco.. I have educated her on the risk of diseases from using tobacco products such as cancer, COPD and heart disease.             Electronically signed by ROSALINDA Santiago,  August 14, 2021 12:49 EDT

## 2021-08-12 NOTE — PATIENT INSTRUCTIONS
Mediterranean Diet  A Mediterranean diet refers to food and lifestyle choices that are based on the traditions of countries located on the [place]. This way of eating has been shown to help prevent certain conditions and improve outcomes for people who have chronic diseases, like kidney disease and heart disease.  What are tips for following this plan?  Lifestyle  · Cook and eat meals together with your family, when possible.  · Drink enough fluid to keep your urine clear or pale yellow.  · Be physically active every day. This includes:  ? Aerobic exercise like running or swimming.  ? Leisure activities like gardening, walking, or housework.  · Get 7-8 hours of sleep each night.  · If recommended by your health care provider, drink red wine in moderation. This means 1 glass a day for nonpregnant women and 2 glasses a day for men. A glass of wine equals 5 oz (150 mL).  Reading food labels    · Check the serving size of packaged foods. For foods such as rice and pasta, the serving size refers to the amount of cooked product, not dry.  · Check the total fat in packaged foods. Avoid foods that have saturated fat or trans fats.  · Check the ingredients list for added sugars, such as corn syrup.  Shopping  · At the grocery store, buy most of your food from the areas near the walls of the store. This includes:  ? Fresh fruits and vegetables (produce).  ? Grains, beans, nuts, and seeds. Some of these may be available in unpackaged forms or large amounts (in bulk).  ? Fresh seafood.  ? Poultry and eggs.  ? Low-fat dairy products.  · Buy whole ingredients instead of prepackaged foods.  · Buy fresh fruits and vegetables in-season from local farmers markets.  · Buy frozen fruits and vegetables in resealable bags.  · If you do not have access to quality fresh seafood, buy precooked frozen shrimp or canned fish, such as tuna, salmon, or sardines.  · Buy small amounts of raw or cooked vegetables, salads, or olives from  the deli or salad bar at your store.  · Stock your pantry so you always have certain foods on hand, such as olive oil, canned tuna, canned tomatoes, rice, pasta, and beans.  Cooking  · Cook foods with extra-virgin olive oil instead of using butter or other vegetable oils.  · Have meat as a side dish, and have vegetables or grains as your main dish. This means having meat in small portions or adding small amounts of meat to foods like pasta or stew.  · Use beans or vegetables instead of meat in common dishes like chili or lasagna.  · [place] with different cooking methods. Try roasting or broiling vegetables instead of steaming or sautéeing them.  · Add frozen vegetables to soups, stews, pasta, or rice.  · Add nuts or seeds for added healthy fat at each meal. You can add these to yogurt, salads, or vegetable dishes.  · Marinate fish or vegetables using olive oil, lemon juice, garlic, and fresh herbs.  Meal planning    · Plan to eat 1 vegetarian meal one day each week. Try to work up to 2 vegetarian meals, if possible.  · Eat seafood 2 or more times a week.  · Have healthy snacks readily available, such as:  ? Vegetable sticks with hummus.  ? Greek yogurt.  ? Fruit and nut trail mix.  · Eat balanced meals throughout the week. This includes:  ? Fruit: 2-3 servings a day  ? Vegetables: 4-5 servings a day  ? Low-fat dairy: 2 servings a day  ? Fish, poultry, or lean meat: 1 serving a day  ? Beans and legumes: 2 or more servings a week  ? Nuts and seeds: 1-2 servings a day  ? Whole grains: 6-8 servings a day  ? Extra-virgin olive oil: 3-4 servings a day  · Limit red meat and sweets to only a few servings a month  What are my food choices?  · Mediterranean diet  ? Recommended  § Grains: Whole-grain pasta. Brown rice. Bulgar wheat. Polenta. Couscous. Whole-wheat bread. Oatmeal. Quinoa.  § Vegetables: Artichokes. Beets. Broccoli. Cabbage. Carrots. Eggplant. Green beans. Chard. Kale. Spinach. Onions. Leeks. Peas. Squash.  Tomatoes. Peppers. Radishes.  § Fruits: Apples. Apricots. Avocado. Berries. Bananas. Cherries. Dates. Figs. Grapes. Melody. Melon. Oranges. Peaches. Plums. Pomegranate.  § Meats and other protein foods: Beans. Almonds. Sunflower seeds. Pine nuts. Peanuts. Cod. [place]. Scallops. Shrimp. Tuna. Tilapia. Clams. Oysters. Eggs.  § Dairy: Low-fat milk. Cheese. Greek yogurt.  § Beverages: Water. Red wine. Herbal tea.  § Fats and oils: Extra virgin olive oil. Avocado oil. Grape seed oil.  § Sweets and desserts: Greek yogurt with honey. Baked apples. Poached pears. Trail mix.  § Seasoning and other foods: Basil. Cilantro. Coriander. Cumin. Mint. Parsley. [place]. Rosemary. Tarragon. Garlic. Oregano. Thyme. Pepper. Balsalmic vinegar. Tahini. Hummus. Tomato sauce. Olives. Mushrooms.  ? Limit these  § Grains: Prepackaged pasta or rice dishes. Prepackaged cereal with added sugar.  § Vegetables: Deep fried potatoes (french fries).  § Fruits: Fruit canned in syrup.  § Meats and other protein foods: Beef. Pork. Lamb. Poultry with skin. Hot dogs. Bueno.  § Dairy: Ice cream. Sour cream. Whole milk.  § Beverages: Juice. Sugar-sweetened soft drinks. Beer. Liquor and spirits.  § Fats and oils: Butter. Canola oil. Vegetable oil. Beef fat (tallow). Lard.  § Sweets and desserts: Cookies. Cakes. Pies. Candy.  § Seasoning and other foods: Mayonnaise. Premade sauces and marinades.  The items listed may not be a complete list. Talk with your dietitian about what dietary choices are right for you.  Summary  · The Mediterranean diet includes both food and lifestyle choices.  · Eat a variety of fresh fruits and vegetables, beans, nuts, seeds, and whole grains.  · Limit the amount of red meat and sweets that you eat.  · Talk with your health care provider about whether it is safe for you to drink red wine in moderation. This means 1 glass a day for nonpregnant women and 2 glasses a day for men. A glass of wine equals 5 oz (150 mL).  This information  is not intended to replace advice given to you by your health care provider. Make sure you discuss any questions you have with your health care provider.  Document Revised: 08/17/2017 Document Reviewed: 08/10/2017  Elsevier Patient Education © 2020 [employer]

## 2021-08-25 RX ORDER — CARVEDILOL 3.12 MG/1
TABLET ORAL
Qty: 60 TABLET | OUTPATIENT
Start: 2021-08-25

## 2021-08-26 RX ORDER — CARVEDILOL 3.12 MG/1
3.12 TABLET ORAL 2 TIMES DAILY
Qty: 180 TABLET | Refills: 3 | Status: SHIPPED | OUTPATIENT
Start: 2021-08-26 | End: 2021-09-15 | Stop reason: SDUPTHER

## 2021-09-14 ENCOUNTER — HOSPITAL ENCOUNTER (OUTPATIENT)
Dept: CARDIOLOGY | Facility: HOSPITAL | Age: 71
Discharge: HOME | End: 2021-09-14

## 2021-09-14 VITALS — HEIGHT: 63 IN | WEIGHT: 158.29 LBS | BODY MASS INDEX: 28.05 KG/M2

## 2021-09-14 DIAGNOSIS — E78.2 MIXED HYPERLIPIDEMIA: ICD-10-CM

## 2021-09-14 DIAGNOSIS — I25.9 IHD (ISCHEMIC HEART DISEASE): ICD-10-CM

## 2021-09-14 DIAGNOSIS — Z95.5 HISTORY OF PLACEMENT OF STENT IN LAD CORONARY ARTERY: ICD-10-CM

## 2021-09-14 DIAGNOSIS — R06.02 SHORTNESS OF BREATH: ICD-10-CM

## 2021-09-14 DIAGNOSIS — E11.9 TYPE 2 DIABETES MELLITUS WITHOUT COMPLICATION, WITHOUT LONG-TERM CURRENT USE OF INSULIN (HCC): ICD-10-CM

## 2021-09-14 DIAGNOSIS — I10 ESSENTIAL HYPERTENSION: ICD-10-CM

## 2021-09-14 LAB
AORTIC DIMENSIONLESS INDEX: 0.9 (DI)
BH CV ECHO MEAS - ACS: 1.5 CM
BH CV ECHO MEAS - AO MAX PG (FULL): 0.31 MMHG
BH CV ECHO MEAS - AO MAX PG: 3.7 MMHG
BH CV ECHO MEAS - AO MEAN PG (FULL): 0 MMHG
BH CV ECHO MEAS - AO MEAN PG: 2 MMHG
BH CV ECHO MEAS - AO ROOT AREA (BSA CORRECTED): 1.6
BH CV ECHO MEAS - AO ROOT AREA: 6.2 CM^2
BH CV ECHO MEAS - AO ROOT DIAM: 2.8 CM
BH CV ECHO MEAS - AO V2 MAX: 96.1 CM/SEC
BH CV ECHO MEAS - AO V2 MEAN: 61.1 CM/SEC
BH CV ECHO MEAS - AO V2 VTI: 18.3 CM
BH CV ECHO MEAS - BSA(HAYCOCK): 1.8 M^2
BH CV ECHO MEAS - BSA: 1.7 M^2
BH CV ECHO MEAS - BZI_BMI: 28 KILOGRAMS/M^2
BH CV ECHO MEAS - BZI_METRIC_HEIGHT: 160 CM
BH CV ECHO MEAS - BZI_METRIC_WEIGHT: 71.7 KG
BH CV ECHO MEAS - EDV(CUBED): 57.1 ML
BH CV ECHO MEAS - EDV(TEICH): 63.9 ML
BH CV ECHO MEAS - EF(CUBED): 63.6 %
BH CV ECHO MEAS - EF(MOD-BP): 55 %
BH CV ECHO MEAS - EF(TEICH): 55.8 %
BH CV ECHO MEAS - ESV(CUBED): 20.8 ML
BH CV ECHO MEAS - ESV(TEICH): 28.3 ML
BH CV ECHO MEAS - FS: 28.6 %
BH CV ECHO MEAS - IVS/LVPW: 0.88
BH CV ECHO MEAS - IVSD: 0.88 CM
BH CV ECHO MEAS - LA DIMENSION: 3.7 CM
BH CV ECHO MEAS - LA/AO: 1.3
BH CV ECHO MEAS - LAT PEAK E' VEL: 5.1 CM/SEC
BH CV ECHO MEAS - LV IVRT: 0.1 SEC
BH CV ECHO MEAS - LV MASS(C)D: 108.7 GRAMS
BH CV ECHO MEAS - LV MASS(C)DI: 62.1 GRAMS/M^2
BH CV ECHO MEAS - LV MAX PG: 3.4 MMHG
BH CV ECHO MEAS - LV MEAN PG: 2 MMHG
BH CV ECHO MEAS - LV V1 MAX: 92 CM/SEC
BH CV ECHO MEAS - LV V1 MEAN: 54.7 CM/SEC
BH CV ECHO MEAS - LV V1 VTI: 22.2 CM
BH CV ECHO MEAS - LVIDD: 3.9 CM
BH CV ECHO MEAS - LVIDS: 2.8 CM
BH CV ECHO MEAS - LVPWD: 0.99 CM
BH CV ECHO MEAS - MED PEAK E' VEL: 5.4 CM/SEC
BH CV ECHO MEAS - MV A MAX VEL: 107 CM/SEC
BH CV ECHO MEAS - MV DEC SLOPE: 587 CM/SEC^2
BH CV ECHO MEAS - MV DEC TIME: 0.16 SEC
BH CV ECHO MEAS - MV E MAX VEL: 67.7 CM/SEC
BH CV ECHO MEAS - MV E/A: 0.63
BH CV ECHO MEAS - MV MAX PG: 6 MMHG
BH CV ECHO MEAS - MV MEAN PG: 2 MMHG
BH CV ECHO MEAS - MV P1/2T MAX VEL: 87.9 CM/SEC
BH CV ECHO MEAS - MV P1/2T: 43.9 MSEC
BH CV ECHO MEAS - MV V2 MAX: 122 CM/SEC
BH CV ECHO MEAS - MV V2 MEAN: 61 CM/SEC
BH CV ECHO MEAS - MV V2 VTI: 24.3 CM
BH CV ECHO MEAS - MVA P1/2T LCG: 2.5 CM^2
BH CV ECHO MEAS - MVA(P1/2T): 5 CM^2
BH CV ECHO MEAS - PA MAX PG: 2.6 MMHG
BH CV ECHO MEAS - PA MEAN PG: 1 MMHG
BH CV ECHO MEAS - PA V2 MAX: 80.3 CM/SEC
BH CV ECHO MEAS - PA V2 MEAN: 55.5 CM/SEC
BH CV ECHO MEAS - PA V2 VTI: 19.8 CM
BH CV ECHO MEAS - RAP SYSTOLE: 10 MMHG
BH CV ECHO MEAS - RVDD: 2.6 CM
BH CV ECHO MEAS - RVSP: 15 MMHG
BH CV ECHO MEAS - SI(AO): 64.4 ML/M^2
BH CV ECHO MEAS - SI(CUBED): 20.7 ML/M^2
BH CV ECHO MEAS - SI(TEICH): 20.4 ML/M^2
BH CV ECHO MEAS - SV(AO): 112.7 ML
BH CV ECHO MEAS - SV(CUBED): 36.3 ML
BH CV ECHO MEAS - SV(TEICH): 35.6 ML
BH CV ECHO MEAS - TR MAX VEL: 109 CM/SEC
BH CV ECHO MEASUREMENTS AVERAGE E/E' RATIO: 12.9
BH CV REST NUCLEAR ISOTOPE DOSE: 10 MCI
BH CV STRESS DURATION MIN STAGE 1: 3
BH CV STRESS DURATION SEC STAGE 1: 0
BH CV STRESS GRADE STAGE 1: 10
BH CV STRESS METS STAGE 1: 5
BH CV STRESS NUCLEAR ISOTOPE DOSE: 30 MCI
BH CV STRESS PROTOCOL 1: NORMAL
BH CV STRESS RECOVERY BP: NORMAL MMHG
BH CV STRESS RECOVERY HR: 83 BPM
BH CV STRESS SPEED STAGE 1: 1.7
BH CV STRESS STAGE 1: 1
LV EF NUC BP: 35 %
MAXIMAL PREDICTED HEART RATE: 147 BPM
MAXIMAL PREDICTED HEART RATE: 147 BPM
PERCENT MAX PREDICTED HR: 88.44 %
SINUS: 3.2 CM
STRESS BASELINE BP: NORMAL MMHG
STRESS BASELINE HR: 77 BPM
STRESS PERCENT HR: 104 %
STRESS POST ESTIMATED WORKLOAD: 7 METS
STRESS POST EXERCISE DUR MIN: 5 MIN
STRESS POST EXERCISE DUR SEC: 35 SEC
STRESS POST PEAK BP: NORMAL MMHG
STRESS POST PEAK HR: 130 BPM
STRESS TARGET HR: 125 BPM
STRESS TARGET HR: 125 BPM

## 2021-09-14 PROCEDURE — 78452 HT MUSCLE IMAGE SPECT MULT: CPT | Performed by: INTERNAL MEDICINE

## 2021-09-14 PROCEDURE — 0 TECHNETIUM SESTAMIBI: Performed by: INTERNAL MEDICINE

## 2021-09-14 PROCEDURE — 93018 CV STRESS TEST I&R ONLY: CPT | Performed by: INTERNAL MEDICINE

## 2021-09-14 PROCEDURE — A9500 TC99M SESTAMIBI: HCPCS | Performed by: INTERNAL MEDICINE

## 2021-09-14 PROCEDURE — 93306 TTE W/DOPPLER COMPLETE: CPT | Performed by: INTERNAL MEDICINE

## 2021-09-14 PROCEDURE — 93306 TTE W/DOPPLER COMPLETE: CPT

## 2021-09-14 PROCEDURE — 93017 CV STRESS TEST TRACING ONLY: CPT

## 2021-09-14 PROCEDURE — 78452 HT MUSCLE IMAGE SPECT MULT: CPT

## 2021-09-14 PROCEDURE — 93016 CV STRESS TEST SUPVJ ONLY: CPT | Performed by: NURSE PRACTITIONER

## 2021-09-14 RX ADMIN — TECHNETIUM TC 99M SESTAMIBI 1 DOSE: 1 INJECTION INTRAVENOUS at 14:36

## 2021-09-14 RX ADMIN — TECHNETIUM TC 99M SESTAMIBI 1 DOSE: 1 INJECTION INTRAVENOUS at 12:11

## 2021-09-15 DIAGNOSIS — R94.39 ABNORMAL NUCLEAR STRESS TEST: ICD-10-CM

## 2021-09-15 DIAGNOSIS — I51.9 LEFT VENTRICULAR DYSFUNCTION: ICD-10-CM

## 2021-09-15 DIAGNOSIS — I25.2 OLD MI (MYOCARDIAL INFARCTION): ICD-10-CM

## 2021-09-15 DIAGNOSIS — I21.02 ST ELEVATION (STEMI) MYOCARDIAL INFARCTION INVOLVING LEFT ANTERIOR DESCENDING CORONARY ARTERY (HCC): ICD-10-CM

## 2021-09-15 DIAGNOSIS — R06.02 SHORTNESS OF BREATH: ICD-10-CM

## 2021-09-15 DIAGNOSIS — Z95.5 HISTORY OF PLACEMENT OF STENT IN LAD CORONARY ARTERY: ICD-10-CM

## 2021-09-15 DIAGNOSIS — I25.9 IHD (ISCHEMIC HEART DISEASE): Primary | ICD-10-CM

## 2021-09-15 RX ORDER — CARVEDILOL 6.25 MG/1
3.12 TABLET ORAL 2 TIMES DAILY
Qty: 60 TABLET | Refills: 8 | Status: SHIPPED | OUTPATIENT
Start: 2021-09-15 | End: 2021-09-15 | Stop reason: SDUPTHER

## 2021-09-15 RX ORDER — CARVEDILOL 6.25 MG/1
6.25 TABLET ORAL 2 TIMES DAILY
Qty: 60 TABLET | Refills: 8 | Status: SHIPPED | OUTPATIENT
Start: 2021-09-15 | End: 2022-11-14

## 2021-09-15 NOTE — TELEPHONE ENCOUNTER
----- Message from ROSALINDA Santiago sent at 9/15/2021 10:29 AM EDT -----  Stress test is abnormal.  EF has dropped to 35%, large area of scarring with no ischemia seen.  Needs viability study to evaluate for any reversibility (living tissue around the infarct).  Will place order.  Can we let her know [facility] will be calling to schedule another set of pictures of her heart.    She walked 5-1/2 minutes with hypertensive blood pressure response and increased chronotropic response.    Add Entresto 24/26 twice daily.  Increase carvedilol to 6.25 mg twice daily.  She would qualify for LifeVest with low EF.  I will discuss with Dr. Mas before placing order.  Prescription will be sent to [facility].  She can take 2 of her Coreg until she gets new prescription.

## 2021-09-17 RX ORDER — NITROGLYCERIN 0.4 MG/1
TABLET SUBLINGUAL
Qty: 25 TABLET | Refills: 1 | Status: SHIPPED | OUTPATIENT
Start: 2021-09-17

## 2021-09-21 ENCOUNTER — HOSPITAL ENCOUNTER (OUTPATIENT)
Dept: CARDIOLOGY | Facility: HOSPITAL | Age: 71
Discharge: HOME | End: 2021-09-21

## 2021-09-21 DIAGNOSIS — I51.9 LEFT VENTRICULAR DYSFUNCTION: ICD-10-CM

## 2021-09-21 DIAGNOSIS — I25.2 OLD MI (MYOCARDIAL INFARCTION): ICD-10-CM

## 2021-09-21 DIAGNOSIS — R94.39 ABNORMAL NUCLEAR STRESS TEST: ICD-10-CM

## 2021-09-21 DIAGNOSIS — R06.02 SHORTNESS OF BREATH: ICD-10-CM

## 2021-09-21 DIAGNOSIS — I21.02 ST ELEVATION (STEMI) MYOCARDIAL INFARCTION INVOLVING LEFT ANTERIOR DESCENDING CORONARY ARTERY (HCC): ICD-10-CM

## 2021-09-21 DIAGNOSIS — I25.9 IHD (ISCHEMIC HEART DISEASE): ICD-10-CM

## 2021-09-21 DIAGNOSIS — Z95.5 HISTORY OF PLACEMENT OF STENT IN LAD CORONARY ARTERY: ICD-10-CM

## 2021-09-21 PROCEDURE — 78469 HEART INFARCT IMAGE (3D): CPT

## 2021-09-21 PROCEDURE — A9505 TL201 THALLIUM: HCPCS | Performed by: INTERNAL MEDICINE

## 2021-09-21 PROCEDURE — 0 THALLOUS CHLORIDE 1 MCI/ML SOLUTION: Performed by: INTERNAL MEDICINE

## 2021-09-21 PROCEDURE — 78469 HEART INFARCT IMAGE (3D): CPT | Performed by: INTERNAL MEDICINE

## 2021-09-21 RX ORDER — THALLOUS CHLORIDE TL-201 1 MCI/ML
4 INJECTION, POWDER, LYOPHILIZED, FOR SOLUTION INTRAVENOUS
Status: COMPLETED | OUTPATIENT
Start: 2021-09-21 | End: 2021-09-21

## 2021-09-21 RX ADMIN — THALLOUS CHLORIDE TL-201 4 MILLICURIE: 1 INJECTION, POWDER, LYOPHILIZED, FOR SOLUTION INTRAVENOUS at 14:48

## 2021-09-22 ENCOUNTER — HOSPITAL ENCOUNTER (OUTPATIENT)
Dept: CARDIOLOGY | Facility: HOSPITAL | Age: 71
Discharge: HOME | End: 2021-09-22

## 2021-09-22 LAB
BH CV REST NUCLEAR ISOTOPE DOSE: 1 MCI
BH CV STRESS NUCLEAR ISOTOPE DOSE: 4 MCI
MAXIMAL PREDICTED HEART RATE: 147 BPM
STRESS TARGET HR: 125 BPM

## 2021-09-22 PROCEDURE — A9505 TL201 THALLIUM: HCPCS | Performed by: INTERNAL MEDICINE

## 2021-09-22 PROCEDURE — 0 THALLOUS CHLORIDE 1 MCI/ML SOLUTION: Performed by: INTERNAL MEDICINE

## 2021-09-22 RX ORDER — THALLOUS CHLORIDE TL-201 1 MCI/ML
1 INJECTION, POWDER, LYOPHILIZED, FOR SOLUTION INTRAVENOUS
Status: COMPLETED | OUTPATIENT
Start: 2021-09-22 | End: 2021-09-22

## 2021-09-22 RX ADMIN — THALLOUS CHLORIDE TL-201 1 MILLICURIE: 1 INJECTION, POWDER, LYOPHILIZED, FOR SOLUTION INTRAVENOUS at 15:20

## 2021-10-18 ENCOUNTER — TELEPHONE (OUTPATIENT)
Dept: CARDIOLOGY | Facility: CLINIC | Age: 71
End: 2021-10-18

## 2021-10-18 DIAGNOSIS — Z95.810 USES LIFEVEST DEFIBRILLATOR: ICD-10-CM

## 2021-10-18 DIAGNOSIS — I25.9 IHD (ISCHEMIC HEART DISEASE): ICD-10-CM

## 2021-10-18 DIAGNOSIS — R06.02 SHORTNESS OF BREATH: ICD-10-CM

## 2021-10-18 DIAGNOSIS — I51.9 LEFT VENTRICULAR DYSFUNCTION: Primary | ICD-10-CM

## 2021-10-18 NOTE — TELEPHONE ENCOUNTER
Please schedule pt for limited echo 90 days after starting Entresto (9/15/21). After 12/15/21. Orders are in.     Patient is wearing LifeVest.     Can we let her know scheduling will be calling her?

## 2021-10-18 NOTE — TELEPHONE ENCOUNTER
Patient aware [facility] will be calling her with date and time of Echo, aware it will be 90 days after starting Entresto, patient verbalized understanding.

## 2021-10-25 ENCOUNTER — TELEPHONE (OUTPATIENT)
Dept: CARDIOLOGY | Facility: CLINIC | Age: 71
End: 2021-10-25

## 2021-10-25 DIAGNOSIS — E78.2 MIXED HYPERLIPIDEMIA: ICD-10-CM

## 2021-10-25 DIAGNOSIS — R06.02 SHORTNESS OF BREATH: ICD-10-CM

## 2021-10-25 DIAGNOSIS — Z95.810 USES LIFEVEST DEFIBRILLATOR: ICD-10-CM

## 2021-10-25 DIAGNOSIS — I51.9 LEFT VENTRICULAR DYSFUNCTION: Primary | ICD-10-CM

## 2021-10-25 DIAGNOSIS — I25.2 OLD MI (MYOCARDIAL INFARCTION): ICD-10-CM

## 2021-10-25 DIAGNOSIS — I47.29 VENTRICULAR TACHYCARDIA (PAROXYSMAL) (HCC): ICD-10-CM

## 2021-10-25 DIAGNOSIS — R00.2 PALPITATIONS: ICD-10-CM

## 2021-10-25 DIAGNOSIS — J44.9 MIXED TYPE COPD (CHRONIC OBSTRUCTIVE PULMONARY DISEASE) (HCC): ICD-10-CM

## 2021-10-25 DIAGNOSIS — I25.9 IHD (ISCHEMIC HEART DISEASE): ICD-10-CM

## 2021-10-29 RX ORDER — AMIODARONE HYDROCHLORIDE 200 MG/1
200 TABLET ORAL DAILY
Qty: 30 TABLET | Refills: 5 | Status: SHIPPED | OUTPATIENT
Start: 2021-10-29

## 2021-11-01 NOTE — TELEPHONE ENCOUNTER
Patient made aware of lifevest report and recommendations to start amiodarone 200mg once daily. Spoke at length with patient concerning medication, patient reports she did not start medication over the weekend due to list of possible side effects she was given from pharmacy. Patient also reported that she stopped wearing lifevest due to causing anxiety while wearing. Patient made aware of risk vs benefits of wearing lifevest and use of medication. Patient reports she will start medication and apply lifevest.     Patient aware of appointment with Dr Mehta on 11/2/21 at 3:30pm and aware of appointment for EKG on 11/2/21 at 2:45pm.

## 2021-11-02 ENCOUNTER — LAB (OUTPATIENT)
Dept: LAB | Facility: HOSPITAL | Age: 71
End: 2021-11-02

## 2021-11-02 DIAGNOSIS — R06.02 SHORTNESS OF BREATH: ICD-10-CM

## 2021-11-02 DIAGNOSIS — I51.9 LEFT VENTRICULAR DYSFUNCTION: ICD-10-CM

## 2021-11-02 DIAGNOSIS — R00.2 PALPITATIONS: ICD-10-CM

## 2021-11-02 DIAGNOSIS — Z95.810 USES LIFEVEST DEFIBRILLATOR: ICD-10-CM

## 2021-11-02 DIAGNOSIS — I47.29 VENTRICULAR TACHYCARDIA (PAROXYSMAL) (HCC): ICD-10-CM

## 2021-11-02 DIAGNOSIS — E78.2 MIXED HYPERLIPIDEMIA: ICD-10-CM

## 2021-11-02 LAB
ALBUMIN SERPL-MCNC: 4.37 G/DL (ref 3.5–5.2)
ALBUMIN/GLOB SERPL: 2.2 G/DL
ALP SERPL-CCNC: 74 U/L (ref 39–117)
ALT SERPL W P-5'-P-CCNC: 13 U/L (ref 1–33)
ANION GAP SERPL CALCULATED.3IONS-SCNC: 10.9 MMOL/L (ref 5–15)
AST SERPL-CCNC: 12 U/L (ref 1–32)
BASOPHILS # BLD AUTO: 0.09 10*3/MM3 (ref 0–0.2)
BASOPHILS NFR BLD AUTO: 1.6 % (ref 0–1.5)
BILIRUB SERPL-MCNC: 0.4 MG/DL (ref 0–1.2)
BUN SERPL-MCNC: 15 MG/DL (ref 8–23)
BUN/CREAT SERPL: 19.5 (ref 7–25)
CALCIUM SPEC-SCNC: 9 MG/DL (ref 8.6–10.5)
CHLORIDE SERPL-SCNC: 103 MMOL/L (ref 98–107)
CHOLEST SERPL-MCNC: 214 MG/DL (ref 0–200)
CO2 SERPL-SCNC: 28.1 MMOL/L (ref 22–29)
CREAT SERPL-MCNC: 0.77 MG/DL (ref 0.57–1)
DEPRECATED RDW RBC AUTO: 46.5 FL (ref 37–54)
EOSINOPHIL # BLD AUTO: 0.23 10*3/MM3 (ref 0–0.4)
EOSINOPHIL NFR BLD AUTO: 4 % (ref 0.3–6.2)
ERYTHROCYTE [DISTWIDTH] IN BLOOD BY AUTOMATED COUNT: 13.4 % (ref 12.3–15.4)
GFR SERPL CREATININE-BSD FRML MDRD: 73 ML/MIN/1.73
GLOBULIN UR ELPH-MCNC: 2 GM/DL
GLUCOSE SERPL-MCNC: 136 MG/DL (ref 65–99)
HCT VFR BLD AUTO: 51 % (ref 34–46.6)
HDLC SERPL-MCNC: 44 MG/DL (ref 40–60)
HGB BLD-MCNC: 16.7 G/DL (ref 12–15.9)
IMM GRANULOCYTES # BLD AUTO: 0.02 10*3/MM3 (ref 0–0.05)
IMM GRANULOCYTES NFR BLD AUTO: 0.3 % (ref 0–0.5)
LDLC SERPL CALC-MCNC: 149 MG/DL (ref 0–100)
LDLC/HDLC SERPL: 3.35 {RATIO}
LYMPHOCYTES # BLD AUTO: 1.34 10*3/MM3 (ref 0.7–3.1)
LYMPHOCYTES NFR BLD AUTO: 23.1 % (ref 19.6–45.3)
MAGNESIUM SERPL-MCNC: 2 MG/DL (ref 1.6–2.4)
MCH RBC QN AUTO: 31 PG (ref 26.6–33)
MCHC RBC AUTO-ENTMCNC: 32.7 G/DL (ref 31.5–35.7)
MCV RBC AUTO: 94.8 FL (ref 79–97)
MONOCYTES # BLD AUTO: 0.62 10*3/MM3 (ref 0.1–0.9)
MONOCYTES NFR BLD AUTO: 10.7 % (ref 5–12)
NEUTROPHILS NFR BLD AUTO: 3.5 10*3/MM3 (ref 1.7–7)
NEUTROPHILS NFR BLD AUTO: 60.3 % (ref 42.7–76)
NRBC BLD AUTO-RTO: 0 /100 WBC (ref 0–0.2)
NT-PROBNP SERPL-MCNC: 156.4 PG/ML (ref 0–900)
PLATELET # BLD AUTO: 238 10*3/MM3 (ref 140–450)
PMV BLD AUTO: 10.5 FL (ref 6–12)
POTASSIUM SERPL-SCNC: 4.3 MMOL/L (ref 3.5–5.2)
PROT SERPL-MCNC: 6.4 G/DL (ref 6–8.5)
RBC # BLD AUTO: 5.38 10*6/MM3 (ref 3.77–5.28)
SODIUM SERPL-SCNC: 142 MMOL/L (ref 136–145)
TRIGL SERPL-MCNC: 114 MG/DL (ref 0–150)
TSH SERPL DL<=0.05 MIU/L-ACNC: 2.83 UIU/ML (ref 0.27–4.2)
VLDLC SERPL-MCNC: 21 MG/DL (ref 5–40)
WBC # BLD AUTO: 5.8 10*3/MM3 (ref 3.4–10.8)

## 2021-11-02 PROCEDURE — 80061 LIPID PANEL: CPT

## 2021-11-02 PROCEDURE — 36415 COLL VENOUS BLD VENIPUNCTURE: CPT

## 2021-11-02 PROCEDURE — 85025 COMPLETE CBC W/AUTO DIFF WBC: CPT

## 2021-11-02 PROCEDURE — 84443 ASSAY THYROID STIM HORMONE: CPT

## 2021-11-02 PROCEDURE — 83735 ASSAY OF MAGNESIUM: CPT

## 2021-11-02 PROCEDURE — 80053 COMPREHEN METABOLIC PANEL: CPT

## 2021-11-02 PROCEDURE — 83880 ASSAY OF NATRIURETIC PEPTIDE: CPT

## 2021-12-28 ENCOUNTER — HOSPITAL ENCOUNTER (OUTPATIENT)
Dept: CARDIOLOGY | Facility: HOSPITAL | Age: 71
Discharge: HOME | End: 2021-12-28
Admitting: NURSE PRACTITIONER

## 2021-12-28 VITALS — HEIGHT: 63 IN | BODY MASS INDEX: 28.05 KG/M2 | WEIGHT: 158.29 LBS

## 2021-12-28 DIAGNOSIS — R06.02 SHORTNESS OF BREATH: ICD-10-CM

## 2021-12-28 DIAGNOSIS — I51.9 LEFT VENTRICULAR DYSFUNCTION: ICD-10-CM

## 2021-12-28 DIAGNOSIS — Z95.810 USES LIFEVEST DEFIBRILLATOR: ICD-10-CM

## 2021-12-28 DIAGNOSIS — I25.9 IHD (ISCHEMIC HEART DISEASE): ICD-10-CM

## 2021-12-28 PROCEDURE — 93308 TTE F-UP OR LMTD: CPT

## 2021-12-28 PROCEDURE — 93308 TTE F-UP OR LMTD: CPT | Performed by: INTERNAL MEDICINE

## 2021-12-29 LAB
BH CV ECHO MEAS - AO ROOT AREA (BSA CORRECTED): 1.5
BH CV ECHO MEAS - AO ROOT AREA: 5.3 CM^2
BH CV ECHO MEAS - AO ROOT DIAM: 2.6 CM
BH CV ECHO MEAS - BSA(HAYCOCK): 1.8 M^2
BH CV ECHO MEAS - BSA: 1.7 M^2
BH CV ECHO MEAS - BZI_BMI: 28 KILOGRAMS/M^2
BH CV ECHO MEAS - BZI_METRIC_HEIGHT: 160 CM
BH CV ECHO MEAS - BZI_METRIC_WEIGHT: 71.7 KG
BH CV ECHO MEAS - EDV(CUBED): 53.2 ML
BH CV ECHO MEAS - EDV(MOD-SP4): 80.7 ML
BH CV ECHO MEAS - EDV(TEICH): 60.4 ML
BH CV ECHO MEAS - EF(CUBED): 37.2 %
BH CV ECHO MEAS - EF(MOD-SP4): 45.6 %
BH CV ECHO MEAS - EF(TEICH): 31.2 %
BH CV ECHO MEAS - ESV(CUBED): 33.4 ML
BH CV ECHO MEAS - ESV(MOD-SP4): 43.9 ML
BH CV ECHO MEAS - ESV(TEICH): 41.6 ML
BH CV ECHO MEAS - FS: 14.4 %
BH CV ECHO MEAS - IVS/LVPW: 0.9
BH CV ECHO MEAS - IVSD: 0.82 CM
BH CV ECHO MEAS - LA DIMENSION: 3 CM
BH CV ECHO MEAS - LA/AO: 1.2
BH CV ECHO MEAS - LV DIASTOLIC VOL/BSA (35-75): 46.1 ML/M^2
BH CV ECHO MEAS - LV MASS(C)D: 94.3 GRAMS
BH CV ECHO MEAS - LV MASS(C)DI: 53.9 GRAMS/M^2
BH CV ECHO MEAS - LV SYSTOLIC VOL/BSA (12-30): 25.1 ML/M^2
BH CV ECHO MEAS - LVIDD: 3.8 CM
BH CV ECHO MEAS - LVIDS: 3.2 CM
BH CV ECHO MEAS - LVLD AP4: 7.9 CM
BH CV ECHO MEAS - LVLS AP4: 7.1 CM
BH CV ECHO MEAS - LVPWD: 0.91 CM
BH CV ECHO MEAS - RVDD: 2.6 CM
BH CV ECHO MEAS - SI(CUBED): 11.3 ML/M^2
BH CV ECHO MEAS - SI(MOD-SP4): 21 ML/M^2
BH CV ECHO MEAS - SI(TEICH): 10.8 ML/M^2
BH CV ECHO MEAS - SV(CUBED): 19.8 ML
BH CV ECHO MEAS - SV(MOD-SP4): 36.8 ML
BH CV ECHO MEAS - SV(TEICH): 18.8 ML
MAXIMAL PREDICTED HEART RATE: 147 BPM
STRESS TARGET HR: 125 BPM

## 2022-02-10 ENCOUNTER — TELEPHONE (OUTPATIENT)
Dept: CARDIOLOGY | Facility: CLINIC | Age: 72
End: 2022-02-10

## 2022-02-10 NOTE — TELEPHONE ENCOUNTER
Patient called to cancel March appointment. She is wanting to transfer to  due to having her device implanted. She wanted me  to let you know she thinks the world of you and appreciates everything you have done for her. Everyone has been so nice to her here  at our office.

## 2022-09-14 RX ORDER — EZETIMIBE 10 MG/1
TABLET ORAL
Qty: 90 TABLET | Refills: 3 | OUTPATIENT
Start: 2022-09-14

## 2022-11-12 DIAGNOSIS — I51.9 LEFT VENTRICULAR DYSFUNCTION: ICD-10-CM

## 2022-11-14 DIAGNOSIS — I51.9 LEFT VENTRICULAR DYSFUNCTION: ICD-10-CM

## 2022-11-14 RX ORDER — CARVEDILOL 6.25 MG/1
TABLET ORAL
Qty: 60 TABLET | Refills: 0 | Status: SHIPPED | OUTPATIENT
Start: 2022-11-14

## 2022-11-14 RX ORDER — CARVEDILOL 6.25 MG/1
TABLET ORAL
Qty: 180 TABLET | OUTPATIENT
Start: 2022-11-14

## 2023-08-28 ENCOUNTER — TELEPHONE (OUTPATIENT)
Dept: CARDIOLOGY | Facility: CLINIC | Age: 73
End: 2023-08-28

## 2023-08-28 NOTE — TELEPHONE ENCOUNTER
Caller: Nelida Ford    Relationship: Self    Best call back number: [phone]     Who is your current provider: ROSALINDA GORDON    Who would you like your new provider to be: JOO SUGN    What are your reasons for transferring care: SECOND OPINOIN

## 2023-10-30 NOTE — TELEPHONE ENCOUNTER
Anticoagulation Clinic - Remote Progress Note  Remote Lab    Indication: paroxysmal afib  Referring Provider: Buddy Javier  Initial Warfarin Start Date:   Goal INR: 2.0-3.0  Current Drug Interactions: omega three fatty acid; CoQ-10   [id number]: 3 (HTN, Age)    Diet: ~ 2 Tablespoon QOD mixed cooked greens; green beans 2x weekly 2/20/2023  Alcohol: None  Tobacco: None  OTC Pain Medication: APAP PRN    INR History:  Date 3/23 3/30 4/8 4/16 4/23 5/7 6/4 6/18 7/2 7/19 7/28 8/11   Total Weekly Dose 27.5mg 25mg 25mg 25mg 25mg 25mg 25mg 25mg 25mg 25mg 25mg 25mg   INR 3.2 2.7 3.2 2.0 2.4 2.7 1.8 1.8 2.3 1.9 1.9 2.6   Notes   LD doxy 4/8    incr GLV  decr GLV incr GLV  incr GLV decr GLV     Date 8/25 9/22 10/20 11/18 11/29 12/28  3/2/22 3/1/ 4/5/22 4/11 4/27   Total WeeklyDose 25mg 25mg 25mg 25mg 25mg 25mg non- compliant 25mg 25 mg 25 mg 25mg 25 mg   INR 2.1 2.8 2.8 3.1 2.4 2.0  2.9 3.2 3.5 2.5 2.8   Notes    decr GLV    APAP  decr GLV       Date 5/31 6/28 7/26 8/25 9/8 10/6 10/20 11/10 11/30 1/4 2/2   Total Weekly Dose 25 mg 25 mg 25 mg 25 mg 25 mg 25 mg 25mg 25mg 25 mg 25 mg 25mg   INR 2.9 2.8 2.6 3.5 2.9 3.5 2.5 2.6 2.7 3.0 3.6   Notes                Date 2/17  3/16 4/17 5/1 5/30 6/23  7/25 8/8 08/22 9/21 10/06   Total Weekly Dose 25mg  25 mg 25 mg  25 mg  25 mg 25mg  25 mg 22.5mg 22.5 mg 22.5 mg 22.5 mg    INR 2.7 3.3 3.5 2.4 3.1 2.9  4.0 2.5 2.9 1.9 2.6   Notes          Inc glv      Date 10/23             Total Weekly Dose 22.5 mg              INR 3.4             Notes APAP  Rec'd 10/24               Phone Interview:  Tablet Strength: 5mg; 2.5mg  Patient Contact Info: [phone] (Mobile)  Estimated OOP cost: will send if patient comes to [place]   Verbal Release Auth:   Lab Contact Info: [facility] POCT    Patient Findings      Plan:  1. INR is therapeutic yesterday at 2.6 .UNABLE TO GET IN CONTACT WITH THE PATIENT. PLEASE DISREGARD THE FOLLOWING PLAN UNTIL ABLE TO GET IN CONTACT WITH PATIENT/ PATIENT  Received fax from Dr. Bravo for cardiac clearance for patient to have a colonoscopy. Patient is on Plavix and they are requesting to hold. According to our records, patient's last stenting was done on 06/24/14.      Fax [phone]   REPRESENTATIVE.         (goal 2.0-3.0). Instructed Mr. Houser to REDUCE tonight's dose to 1.25 mg then continue maintenance regimen of warfarin 2.5 mg oral daily except 5 mg WedSat until recheck (21.25 mg/week)   2. Repeat INR on 10/30/23, if pt is in range next check consider pushing out further.   3. Verbal information provided over the phone. Kurt Houser RBV dosing instructions, expresses understanding by teach back, and has no further questions at this time.

## 2024-10-25 ENCOUNTER — OUTSIDE FACILITY SERVICE (OUTPATIENT)
Dept: CARDIOLOGY | Facility: CLINIC | Age: 74
End: 2024-10-25
Payer: MEDICARE